# Patient Record
Sex: FEMALE | Race: WHITE
[De-identification: names, ages, dates, MRNs, and addresses within clinical notes are randomized per-mention and may not be internally consistent; named-entity substitution may affect disease eponyms.]

---

## 2017-04-13 ENCOUNTER — HOSPITAL ENCOUNTER (EMERGENCY)
Dept: HOSPITAL 41 - JD.ED | Age: 82
Discharge: HOME | End: 2017-04-13
Payer: MEDICARE

## 2017-04-13 VITALS — SYSTOLIC BLOOD PRESSURE: 173 MMHG | DIASTOLIC BLOOD PRESSURE: 83 MMHG

## 2017-04-13 DIAGNOSIS — I48.91: ICD-10-CM

## 2017-04-13 DIAGNOSIS — Z79.899: ICD-10-CM

## 2017-04-13 DIAGNOSIS — E03.9: ICD-10-CM

## 2017-04-13 DIAGNOSIS — I10: ICD-10-CM

## 2017-04-13 DIAGNOSIS — Z79.01: ICD-10-CM

## 2017-04-13 DIAGNOSIS — R55: Primary | ICD-10-CM

## 2017-04-13 NOTE — EDM.PDOC
ED HPI NEURO





- General


Chief Complaint: Neurological Problem


Stated Complaint: [facility]


Time Seen by Provider: 04/13/17 09:49


Source of Information: Reports: Patient, EMS, RN notes reviewed





- History of Present Illness


INITIAL COMMENTS - FREE TEXT/NARRATIVE: 





86-year-old female who suffered a near syncopal spell a very short time ago, 

she has had multiple similar episodes over this past 2 or 3 weeks. SHe has had 

a recent MRI of her head, CT of chest abdomen pelvis. She states she is 

scheduled for an ultrasound study early next week. This morning she was sitting 

reading a newspaper when she began to feel very weak, lightheaded and dizzy. 

She states she felt warm from the chest up. She felt like she was about to pass 

out. She had no chest discomfort, no abdominal pain nausea or vomiting. She has 

not been ill with recent vomiting diarrhea or other unusual symptoms. She does 

have history of chronic atrial fib  on Coumadin.





- Related Data


Allergies/ADRs: 


 Allergies











Allergy/AdvReac Type Severity Reaction Status Date / Time


 


No Known Allergies Allergy   Verified 04/13/17 09:52











Home Meds: 


 Home Meds





Calcipotriene 1 ampule TOP ONCALL PRN 04/23/15 [History]


Diltiazem HCl [Diltiazem 24Hr Cd] 180 mg PO DAILY 04/23/15 [History]


Levothyroxine 112 mcg PO DAILY 04/23/15 [History]


Metoprolol Tartrate [Lopressor] 50 mg PO Q8H 04/23/15 [History]


Warfarin Sodium 5 mg PO ASDIRECTED 04/23/15 [History]











Past Medical History


Cardiovascular History: Reports: Afib, Hypertension


Endocrine/Metabolic History: Reports: Hypothyroidism


Oncologic (Cancer) History: Reports: Breast





- Past Surgical History


Oncologic Surgical History: Reports: Mastectomy





Social & Family History





- Tobacco Use


Smoking Status *Q: Never Smoker


Years of Tobacco use: 30


Used Tobacco, but Quit: Yes


Month Tobacco Last Used: 1980


Second Hand Smoke Exposure: No





- Caffeine Use


Caffeine Use: Reports: None





- Alcohol Use


Days Per Week of Alcohol Use: 0





- Recreational Drug Use


Recreational Drug Use: No





ED ROS GENERAL





- Review of Systems


Review Of Systems: See Below


Constitutional: Reports: diaphoresis (Gone).  Denies: fever, chills


HEENT: Denies: Sinus problem, Throat pain, Vertigo


Respiratory: Denies: Shortness of Breath, Wheezing, Pleuritic Chest Pain


Cardiovascular: Denies: Chest pain


GI/Abdominal: Denies: Abdominal pain, Nausea, Vomiting


Musculoskeletal: Denies: neck pain, shoulder pain


Skin: Denies: rash


Neurological: Reports: Dizziness (Gone), Weakness (Generalized, gone).  Denies: 

Headache, Numbness, Tingling





ED EXAM, NEURO





- Physical Exam


Exam: See Below


General Appearance: alert, no apparent distress


Eye Exam: bilateral eye: PERRL


Throat/Mouth: Normal inspection, Normal oropharynx


Head Exam: atraumatic.  No: facial swelling


Neck: supple, full range of motion


Respiratory/Chest: lungs clear, normal breath sounds


Cardiovascular: irregularly irregular


GI/Abdominal: soft, non tender.  No: guarding


Neurological: alert, no motor/sensory deficits, oriented x 3


Extremities: normal inspection, normal range of motion.  No: pedal edema, leg 

pain


Skin Exam: Warm, Dry, Normal color





EKG INTERPRETATION


EKG Date: 04/13/17


Rhythm: a-fib


Rate (beats/min): 64


QRS: normal


ST-T: normal





Course





- Vital Signs


Last Recorded V/S: 


 Last Vital Signs











Temp  97.3 F   04/13/17 09:48


 


Pulse  65   04/13/17 09:48


 


Resp  13   04/13/17 09:48


 


BP  173/83 H  04/13/17 09:48


 


Pulse Ox  95   04/13/17 09:48














- Orders/Labs/Meds


Orders: 


 Active Orders 24 hr











 Category Date Time Status


 


 EKG 12 Lead [EKG Documentation Completion] [RC] STAT Care  04/13/17 09:56 

Active


 


 Holter Monitor 48 Hours [RC] .PRN Care  04/13/17 11:34 Active











Labs: 


 Laboratory Tests











  04/13/17 04/13/17 04/13/17 Range/Units





  10:21 10:21 10:21 


 


WBC  6.83    (3.98-10.04)  K/mm3


 


RBC  4.26    (3.98-5.22)  M/mm3


 


Hgb  12.9    (11.2-15.7)  gm/L


 


Hct  38.5    (34.1-44.9)  %


 


MCV  90.4    (79.4-94.8)  fl


 


MCH  30.3    (25.6-32.2)  pg


 


MCHC  33.5    (32.2-35.5)  g/dl


 


RDW Std Deviation  47.4 H    (36.4-46.3)  fL


 


Plt Count  212    (182-369)  K/mm3


 


MPV  9.7    (9.4-12.3)  fl


 


Neut % (Auto)  70.8    (34.0-71.1)  %


 


Lymph % (Auto)  14.8 L    (19.3-51.7)  %


 


Mono % (Auto)  10.7    (4.7-12.5)  %


 


Eos % (Auto)  2.8    (0.7-5.8)  


 


Baso % (Auto)  0.9    (0.1-1.2)  %


 


Neut # (Auto)  4.84    (1.56-6.13)  K/mm3


 


Lymph # (Auto)  1.01 L    (1.18-3.74)  K/mm3


 


Mono # (Auto)  0.73 H    (0.24-0.36)  K/mm3


 


Eos # (Auto)  0.19    (0.04-0.36)  K/mm3


 


Baso # (Auto)  0.06    (0.01-0.08)  K/mm3


 


PT    24.5 H  (8.0-13.0)  SECONDS


 


INR    2.14  


 


Sodium   133 L   (136-145)  mEq/L


 


Potassium   4.5   (3.5-5.1)  mEq/L


 


Chloride   98   ()  mEq/L


 


Carbon Dioxide   26   (21-32)  mEq/L


 


Anion Gap   13.5   (5-15)  


 


BUN   14   (7-18)  mg/dL


 


Creatinine   0.9   (0.55-1.02)  mg/dL


 


Est Cr Clr Drug Dosing   40.38   mL/min


 


Estimated GFR (MDRD)   59   (>60)  mL/min


 


BUN/Creatinine Ratio   15.6   (14-18)  


 


Glucose   95   ()  mg/dL


 


Calcium   8.0 L   (8.5-10.1)  mg/dL


 


Total Bilirubin   0.6   (0.2-1.0)  mg/dL


 


AST   28   (15-37)  U/L


 


ALT   31   (14-59)  U/L


 


Alkaline Phosphatase   97   ()  U/L


 


Total Protein   7.6   (6.4-8.2)  g/dl


 


Albumin   3.9   (3.4-5.0)  g/dl


 


Globulin   3.7   gm/dL


 


Albumin/Globulin Ratio   1.1   (1-2)  














Departure





- Departure


Time of Disposition: 11:59


Disposition: Home, Self-Care 01


Condition: fair


Clinical Impression: 


 Near syncope





Instructions:  Near-Syncope, Easy-to-Read


Referrals: 


Bernice Frye, NP [Primary Care Provider] - 


Forms:  ED Department Discharge


Additional Instructions: 


Rest, drink plenty of water to maintain hydration, as discussed if you do get 

dizzy again try lying flat as best you can until that resolves, continue 

current medications, 48 hour Holter monitor, see Dr. Lopez early next week as 

planned, return to ED as needed, especially if symptoms worsening in any way





- My Orders


Last 24 Hours: 


My Active Orders





04/13/17 09:56


EKG 12 Lead [EKG Documentation Completion] [RC] STAT 





04/13/17 11:34


Holter Monitor 48 Hours [RC] .PRN 














- Assessment/Plan


Last 24 Hours: 


My Active Orders





04/13/17 09:56


EKG 12 Lead [EKG Documentation Completion] [RC] STAT 





04/13/17 11:34


Holter Monitor 48 Hours [RC] .PRN

## 2018-03-31 ENCOUNTER — HOSPITAL ENCOUNTER (INPATIENT)
Dept: HOSPITAL 41 - JD.ED | Age: 83
LOS: 4 days | Discharge: HOME | DRG: 439 | End: 2018-04-04
Attending: INTERNAL MEDICINE | Admitting: INTERNAL MEDICINE
Payer: MEDICARE

## 2018-03-31 DIAGNOSIS — R06.02: ICD-10-CM

## 2018-03-31 DIAGNOSIS — R09.02: ICD-10-CM

## 2018-03-31 DIAGNOSIS — E78.5: ICD-10-CM

## 2018-03-31 DIAGNOSIS — N18.3: ICD-10-CM

## 2018-03-31 DIAGNOSIS — Z79.899: ICD-10-CM

## 2018-03-31 DIAGNOSIS — E83.51: ICD-10-CM

## 2018-03-31 DIAGNOSIS — Z90.11: ICD-10-CM

## 2018-03-31 DIAGNOSIS — K85.10: ICD-10-CM

## 2018-03-31 DIAGNOSIS — Z90.10: ICD-10-CM

## 2018-03-31 DIAGNOSIS — Z85.3: ICD-10-CM

## 2018-03-31 DIAGNOSIS — K86.2: ICD-10-CM

## 2018-03-31 DIAGNOSIS — E87.6: ICD-10-CM

## 2018-03-31 DIAGNOSIS — Z87.891: ICD-10-CM

## 2018-03-31 DIAGNOSIS — K85.90: Primary | ICD-10-CM

## 2018-03-31 DIAGNOSIS — E86.0: ICD-10-CM

## 2018-03-31 DIAGNOSIS — I12.9: ICD-10-CM

## 2018-03-31 DIAGNOSIS — I48.91: ICD-10-CM

## 2018-03-31 DIAGNOSIS — R19.7: ICD-10-CM

## 2018-03-31 DIAGNOSIS — I10: ICD-10-CM

## 2018-03-31 DIAGNOSIS — R53.1: ICD-10-CM

## 2018-03-31 DIAGNOSIS — Z79.01: ICD-10-CM

## 2018-03-31 DIAGNOSIS — Z91.14: ICD-10-CM

## 2018-03-31 DIAGNOSIS — E03.9: ICD-10-CM

## 2018-03-31 DIAGNOSIS — R10.9: ICD-10-CM

## 2018-03-31 PROCEDURE — 81001 URINALYSIS AUTO W/SCOPE: CPT

## 2018-03-31 PROCEDURE — 83690 ASSAY OF LIPASE: CPT

## 2018-03-31 PROCEDURE — 84484 ASSAY OF TROPONIN QUANT: CPT

## 2018-03-31 PROCEDURE — 96361 HYDRATE IV INFUSION ADD-ON: CPT

## 2018-03-31 PROCEDURE — 86738 MYCOPLASMA ANTIBODY: CPT

## 2018-03-31 PROCEDURE — 80053 COMPREHEN METABOLIC PANEL: CPT

## 2018-03-31 PROCEDURE — 96375 TX/PRO/DX INJ NEW DRUG ADDON: CPT

## 2018-03-31 PROCEDURE — 85025 COMPLETE CBC W/AUTO DIFF WBC: CPT

## 2018-03-31 PROCEDURE — 96376 TX/PRO/DX INJ SAME DRUG ADON: CPT

## 2018-03-31 PROCEDURE — 36415 COLL VENOUS BLD VENIPUNCTURE: CPT

## 2018-03-31 PROCEDURE — 93005 ELECTROCARDIOGRAM TRACING: CPT

## 2018-03-31 PROCEDURE — 99285 EMERGENCY DEPT VISIT HI MDM: CPT

## 2018-03-31 PROCEDURE — 74177 CT ABD & PELVIS W/CONTRAST: CPT

## 2018-03-31 PROCEDURE — 96374 THER/PROPH/DIAG INJ IV PUSH: CPT

## 2018-03-31 RX ADMIN — DILTIAZEM HYDROCHLORIDE SCH MG: 60 TABLET, FILM COATED ORAL at 13:06

## 2018-03-31 RX ADMIN — DILTIAZEM HYDROCHLORIDE SCH: 60 TABLET, FILM COATED ORAL at 14:08

## 2018-03-31 NOTE — PCM.HP
H&P History of Present Illness





- General


Date of Service: 03/31/18


Admit Problem/Dx: 


 Admission Diagnosis/Problem





Admission Diagnosis/Problem      Pancreatitis








Source of Information: Provider


History Limitations: Reports: No Limitations





- History of Present Illness


Initial Comments - Free Text/Narative: 














87 year old female who felt well until 2100 hour, and became nauseated.  

Associated with the nausea was abdominal pain and vomiting.  She denies chest 

pain or SOB.  However admits to palpitations and was found to be in A Fib with 

RVR in the ED.  The patient had been seen in [place] where she lives.  She does 

not have a consistent provider.  The patient did not participate freely with 

the history portion of the exam.  When asked about what she eats, she replied, 

"This and that." Dietary restriction to avoid gall bladder attacks were poorly 

received without her active participation.  She denied fever or chills as well 

as no recent change in appetite.  She will be admitted to the ICU, and has been 

started on a Cardizem drip. 


Onset of Symptoms: Reports: Sudden


Symptom Onset Date: 03/30/18


Duration of Symptoms: Reports: Hour(s):, Getting Worse


Location: Reports: Abdomen, Generalized


Severity: Moderate


Improves with: Reports: Medication


Worsens with: Reports: None


Associated Symptoms: Reports: Nausea/Vomiting, Weakness





- Related Data


Allergies/Adverse Reactions: 


 Allergies











Allergy/AdvReac Type Severity Reaction Status Date / Time


 


No Known Allergies Allergy   Verified 03/31/18 06:58











Home Medications: 


 Home Meds





Diltiazem HCl [Diltiazem 24Hr Cd] 120 mg PO DAILY 04/23/15 [History]


Levothyroxine 112 mcg PO SUFRSA 04/23/15 [History]


Metoprolol Tartrate [Lopressor] 50 mg PO BID 04/23/15 [History]


Warfarin Sodium 5 mg PO ASDIRECTED 04/23/15 [History]


Levothyroxine [Synthroid] 100 mcg PO MOTUWETH 03/31/18 [History]











Past Medical History


Cardiovascular History: Reports: Afib, Hypertension


Endocrine/Metabolic History: Reports: Hypothyroidism


Oncologic (Cancer) History: Reports: Breast





- Past Surgical History


Cardiovascular Surgical History: Reports: None


Oncologic Surgical History: Reports: Mastectomy


Other Oncologic Surgeries/Procedures: right breast





Social & Family History





- Family History


Family Medical History: Noncontributory





- Tobacco Use


Smoking Status *Q: Former Smoker


Years of Tobacco use: 20


Used Tobacco, but Quit: Yes


Month/Year Tobacco Last Used: 1980


Second Hand Smoke Exposure: No





- Caffeine Use


Caffeine Use: Reports: Coffee





- Alcohol Use


Days Per Week of Alcohol Use: 0





- Recreational Drug Use


Recreational Drug Use: No





H&P Review of Systems





- Review of Systems:


Review Of Systems: See Below


General: Reports: Weakness, Decreased Appetite


HEENT: Reports: No Symptoms


Pulmonary: Reports: No Symptoms


Cardiovascular: Reports: Lightheadedness


Gastrointestinal: Reports: Abdominal Pain, Decreased Appetite


Genitourinary: Reports: No Symptoms


Musculoskeletal: Reports: No Symptoms


Skin: Reports: No Symptoms


Psychiatric: Reports: No Symptoms


Neurological: Reports: No Symptoms


Hematologic/Lymphatic: Reports: No Symptoms


Immunologic: Reports: No Symptoms





Exam





- Exam


Exam: See Below





- Vital Signs


Vital Signs: 


 Last Vital Signs











Temp  37.6 C   03/31/18 08:00


 


Pulse  95   03/31/18 10:31


 


Resp  15   03/31/18 08:00


 


BP  127/65   03/31/18 10:31


 


Pulse Ox  98   03/31/18 08:00











Weight: 71.758 kg





- Exam


Quality Assessment: DVT Prophylaxis


General: Alert, Oriented, Cooperative


HEENT: Conjunctiva Clear, Pupils Equal, Pupils Reactive


Neck: Trachea Midline


Lungs: Clear to Auscultation, Normal Respiratory Effort


Cardiovascular: Regular Rate, Irregular Rhythm, Tachycardia


GI/Abdominal Exam: Normal Bowel Sounds, Soft, Non-Tender, No Organomegaly, No 

Distention


 (Female) Exam: Deferred


Rectal (Female) Exam: Deferred


Back Exam: Normal Inspection


Extremities: Normal Inspection, Normal Range of Motion, Non-Tender


Skin: Warm


Neurological: Cranial Nerves Intact


Neuro Extensive - Mental Status: Alert, Oriented x3


Neuro Extensive - Motor, Sensory, Reflexes: CN II-XII Intact


Psychiatric: Alert, Normal Affect, Normal Mood





- Patient Data


Lab Results Last 24 hrs: 


 Laboratory Results - last 24 hr











  03/31/18 03/31/18 03/31/18 Range/Units





  01:50 01:50 01:50 


 


WBC  13.59 H    (3.98-10.04)  K/mm3


 


RBC  4.49    (3.98-5.22)  M/mm3


 


Hgb  13.6    (11.2-15.7)  gm/L


 


Hct  41.2    (34.1-44.9)  %


 


MCV  91.8    (79.4-94.8)  fl


 


MCH  30.3    (25.6-32.2)  pg


 


MCHC  33.0    (32.2-35.5)  g/dl


 


RDW Std Deviation  47.6 H    (36.4-46.3)  fL


 


Plt Count  245    (182-369)  K/mm3


 


MPV  9.4    (9.4-12.3)  fl


 


Neut % (Auto)  92.1 H    (34.0-71.1)  %


 


Lymph % (Auto)  3.2 L    (19.3-51.7)  %


 


Mono % (Auto)  4.1 L    (4.7-12.5)  %


 


Eos % (Auto)  0.4 L    (0.7-5.8)  


 


Baso % (Auto)  0.1    (0.1-1.2)  %


 


Neut # (Auto)  12.49 H    (1.56-6.13)  K/mm3


 


Lymph # (Auto)  0.44 L    (1.18-3.74)  K/mm3


 


Mono # (Auto)  0.56 H    (0.24-0.36)  K/mm3


 


Eos # (Auto)  0.06    (0.04-0.36)  K/mm3


 


Baso # (Auto)  0.02    (0.01-0.08)  K/mm3


 


Manual Slide Review  Normal smear    


 


Sodium   143   (136-145)  mEq/L


 


Potassium   3.8   (3.5-5.1)  mEq/L


 


Chloride   103   ()  mEq/L


 


Carbon Dioxide   28   (21-32)  mEq/L


 


Anion Gap   15.8 H   (5-15)  


 


BUN   18   (7-18)  mg/dL


 


Creatinine   1.2 H   (0.55-1.02)  mg/dL


 


Est Cr Clr Drug Dosing   28.38   mL/min


 


Estimated GFR (MDRD)   42   (>60)  mL/min


 


BUN/Creatinine Ratio   15.0   (14-18)  


 


Glucose   144 H   ()  mg/dL


 


Calcium   8.1 L   (8.5-10.1)  mg/dL


 


Total Bilirubin   0.5   (0.2-1.0)  mg/dL


 


AST   28   (15-37)  U/L


 


ALT   23   (14-59)  U/L


 


Alkaline Phosphatase   102   ()  U/L


 


Troponin I   < 0.017   (0.00-0.056)  ng/mL


 


Total Protein   8.2   (6.4-8.2)  g/dl


 


Albumin   4.2   (3.4-5.0)  g/dl


 


Globulin   4.0   gm/dL


 


Albumin/Globulin Ratio   1.1   (1-2)  


 


Lipase   426 H   ()  U/L


 


Urine Color     (Yellow)  


 


Urine Appearance     (Clear)  


 


Urine pH     (5.0-8.0)  


 


Ur Specific Gravity     (1.005-1.030)  


 


Urine Protein     (Negative)  


 


Urine Glucose (UA)     (Negative)  


 


Urine Ketones     (Negative)  


 


Urine Occult Blood     (Negative)  


 


Urine Nitrite     (Negative)  


 


Urine Bilirubin     (Negative)  


 


Urine Urobilinogen     (0.2-1.0)  


 


Ur Leukocyte Esterase     (Negative)  


 


Urine RBC     (0-5)  /hpf


 


Urine WBC     (0-5)  /hpf


 


Ur Epithelial Cells     (0-5)  /hpf


 


Ur Squamous Epith Cells     (0-5)  /hpf


 


Urine Bacteria     (FEW)  /hpf


 


Urine Mucus     (FEW)  /hpf


 


Mycoplasma pneumon IgM    Negative  (NEGATIVE)  














  03/31/18 Range/Units





  03:30 


 


WBC   (3.98-10.04)  K/mm3


 


RBC   (3.98-5.22)  M/mm3


 


Hgb   (11.2-15.7)  gm/L


 


Hct   (34.1-44.9)  %


 


MCV   (79.4-94.8)  fl


 


MCH   (25.6-32.2)  pg


 


MCHC   (32.2-35.5)  g/dl


 


RDW Std Deviation   (36.4-46.3)  fL


 


Plt Count   (182-369)  K/mm3


 


MPV   (9.4-12.3)  fl


 


Neut % (Auto)   (34.0-71.1)  %


 


Lymph % (Auto)   (19.3-51.7)  %


 


Mono % (Auto)   (4.7-12.5)  %


 


Eos % (Auto)   (0.7-5.8)  


 


Baso % (Auto)   (0.1-1.2)  %


 


Neut # (Auto)   (1.56-6.13)  K/mm3


 


Lymph # (Auto)   (1.18-3.74)  K/mm3


 


Mono # (Auto)   (0.24-0.36)  K/mm3


 


Eos # (Auto)   (0.04-0.36)  K/mm3


 


Baso # (Auto)   (0.01-0.08)  K/mm3


 


Manual Slide Review   


 


Sodium   (136-145)  mEq/L


 


Potassium   (3.5-5.1)  mEq/L


 


Chloride   ()  mEq/L


 


Carbon Dioxide   (21-32)  mEq/L


 


Anion Gap   (5-15)  


 


BUN   (7-18)  mg/dL


 


Creatinine   (0.55-1.02)  mg/dL


 


Est Cr Clr Drug Dosing   mL/min


 


Estimated GFR (MDRD)   (>60)  mL/min


 


BUN/Creatinine Ratio   (14-18)  


 


Glucose   ()  mg/dL


 


Calcium   (8.5-10.1)  mg/dL


 


Total Bilirubin   (0.2-1.0)  mg/dL


 


AST   (15-37)  U/L


 


ALT   (14-59)  U/L


 


Alkaline Phosphatase   ()  U/L


 


Troponin I   (0.00-0.056)  ng/mL


 


Total Protein   (6.4-8.2)  g/dl


 


Albumin   (3.4-5.0)  g/dl


 


Globulin   gm/dL


 


Albumin/Globulin Ratio   (1-2)  


 


Lipase   ()  U/L


 


Urine Color  Yellow  (Yellow)  


 


Urine Appearance  Clear  (Clear)  


 


Urine pH  5.5  (5.0-8.0)  


 


Ur Specific Gravity  1.025  (1.005-1.030)  


 


Urine Protein  Negative  (Negative)  


 


Urine Glucose (UA)  Negative  (Negative)  


 


Urine Ketones  Negative  (Negative)  


 


Urine Occult Blood  2+ H  (Negative)  


 


Urine Nitrite  Negative  (Negative)  


 


Urine Bilirubin  Negative  (Negative)  


 


Urine Urobilinogen  0.2  (0.2-1.0)  


 


Ur Leukocyte Esterase  Negative  (Negative)  


 


Urine RBC  0-5  (0-5)  /hpf


 


Urine WBC  0-5  (0-5)  /hpf


 


Ur Epithelial Cells  0-5  (0-5)  /hpf


 


Ur Squamous Epith Cells  0-5  (0-5)  /hpf


 


Urine Bacteria  Few  (FEW)  /hpf


 


Urine Mucus  Not seen  (FEW)  /hpf


 


Mycoplasma pneumon IgM   (NEGATIVE)  











Result Diagrams: 


 03/31/18 01:50





 03/31/18 01:50





- Problem List


(1) Diarrhea


SNOMED Code(s): 49824727


   ICD Code: R19.7 - DIARRHEA, UNSPECIFIED   Status: Acute   Current Visit: Yes

   





(2) Pancreatitis


SNOMED Code(s): 35227726


   ICD Code: K85.90 - ACUTE PANCREATITIS WITHOUT NECROSIS OR INFECTION, UNSP   

Status: Acute   Current Visit: Yes   


Qualifiers: 


   Chronicity: acute   Pancreatitis type: other   Acute pancreatitis 

complication: no infection or necrosis   Qualified Code(s): K85.80 - Other 

acute pancreatitis without necrosis or infection   





(3) Vomiting


SNOMED Code(s): 298923227


   ICD Code: R11.10 - VOMITING, UNSPECIFIED   Status: Acute   Current Visit: 

Yes   


Problem List Initiated/Reviewed/Updated: Yes


Orders Last 24hrs: 


 Active Orders 24 hr











 Category Date Time Status


 


 Patient Status [ADT] Routine ADT  03/31/18 05:49 Active


 


 NPO [Nothing Per Oral Diet] [DIET] Diet  03/31/18 Lunch Active


 


 Abdomen Pelvis w Cont [CT] Stat Exams  03/31/18 03:22 Taken


 


 INFLUENZA A+B AG SCREEN [RM] Routine Lab  03/31/18 09:55 Ordered


 


 STREP PNEUMONIAE ANTIGEN [MREF] Routine Lab  03/31/18 09:54 Ordered


 


 UA W/MICROSCOPIC [URIN] Stat Lab  03/31/18 03:30 Ordered


 


 Acetaminophen [Tylenol] Med  03/31/18 07:10 Active





 650 mg PO Q4H PRN   


 


 Diltiazem 125 mg Med  03/31/18 05:30 Active





 Sodium Chloride 0.9% [Normal Saline] 100 ml   





 IV TITRATE   


 


 Sodium Chloride 0.9% [Normal Saline] 1,000 ml Med  03/31/18 03:45 Active





 IV ASDIRECTED   


 


 Sodium Chloride 0.9% [Saline Flush] Med  03/31/18 01:50 Active





 10 ml FLUSH ASDIRECTED PRN   


 


 Sodium Chloride 0.9% [Saline Flush] Med  03/31/18 04:10 Active





 10 ml FLUSH ONETIME PRN   


 


 ED Antiemetic Medication Reflex [OM.PC] Stat Oth  03/31/18 01:50 Ordered


 


 Peripheral IV Insertion Adult [OM.PC] Stat Oth  03/31/18 01:50 Ordered


 


 Code Status [Resuscitation Status] Routine Resus Stat  03/31/18 10:46 Ordered


 


 EKG 12 Lead [EK] Stat Ther  03/31/18 01:52 Ordered








 Medication Orders





Acetaminophen (Tylenol)  650 mg PO Q4H PRN


   PRN Reason: Fever


Sodium Chloride (Normal Saline)  1,000 mls @ 125 mls/hr IV ASDIRECTED EZEKIEL


   Last Admin: 03/31/18 03:41  Dose: 125 mls/hr


Diltiazem HCl 125 mg/ Sodium (Chloride)  125 mls @ 5 mls/hr IV TITRATE EZEKIEL; 

Protocol


   Last Titration: 03/31/18 08:08  Dose: 3 mg/hr, 3 mls/hr


   Admin: 03/31/18 05:47  Dose: 5 mg/hr, 5 mls/hr


Sodium Chloride (Saline Flush)  10 ml FLUSH ASDIRECTED PRN


   PRN Reason: Keep Vein Open


   Last Admin: 03/31/18 01:58  Dose: 10 ml


Sodium Chloride (Saline Flush)  10 ml FLUSH ONETIME PRN


   PRN Reason: IV FLUSH


   Last Admin: 03/31/18 04:30  Dose: 10 ml








Assessment/Plan Comment:: 











Impression:





A Fib with RVR





Gallstone pancreatitis





Dehydration





CKD, stage 3











Chronic


Hx of Breast CA


HTN


HLD











Plan:





ICU


NPO except ice chips and meds, 


advance to clear liquids


Supportive care


Daily Labs


Home meds


Cardizem gtt


Abdominal US re: gallstones/pancreas


DVT/GI prophylaxis


Consult PT/OT/CM

## 2018-03-31 NOTE — CT
CT abdomen and pelvis

 

Technique: Multiple axial sections were obtained from above the dome 

of the diaphragm inferiorly through the pubic symphysis.  Intravenous 

contrast was utilized.  No oral contrast has been given.

 

Comparison: Previous CT abdomen and pelvis exam of 12/07/09 and prior 

CT abdomen and pelvis study of 04/03/17.

 

Findings: Visualized portions of both lung bases show nothing acute.  

Heart is slightly enlarged.  Enhancing lesions are seen within the 

liver which remain stable and are felt to be benign.  Adrenal glands 

show no nodule.  Spleen appears within normal limits.  Main pancreatic

 duct is slightly prominent.  Cystic area is noted within the 

pancreatic head inferiorly measuring about 2.3 cm.  This is similar to

 most recent CT exam most likely representing a benign pancreatic 

tumor.  Increased density is seen within the gallbladder most likely 

due to gallstones.  Kidneys show symmetric contrast enhancement 

without hydronephrosis or mass.

 

Aorta shows atherosclerotic change which continues into the iliac 

vessels.  No retroperitoneal adenopathy or mesenteric abnormalities 

are seen.  No pelvic mass or adenopathy is seen.  Diverticuli are seen

 within the descending and sigmoid colon without evidence of 

diverticulitis.  No free fluid or inflammatory change is seen within 

the abdomen or pelvis.  Appendix is not visualized with certainty.

 

Delayed images show contrast within the bladder.

 

Bone window settings were reviewed which show diffuse degenerative 

change throughout the spine.

 

Impression:

1.  Cystic lesion within the head of the pancreas most likely due to 

benign tumor.  This is similar to prior CT study from 2017.

2.  Several hyperenhancing lesions within the liver which also remain 

stable and felt to be benign.

3.  Increased density within the gallbladder most likely due to 

gallstones.

4.  Other incidental findings.

 

Insignificant disagree with preliminary report issued by [employer] (preliminary report signed at 03/31/18, 5:51 AM [facility] 

Time)

## 2018-03-31 NOTE — EDM.PDOC
ED HPI GENERAL MEDICAL PROBLEM





- General


Chief Complaint: Gastrointestinal Problem


Stated Complaint: [facility]


Time Seen by Provider: 03/31/18 01:44


Source of Information: Reports: Patient, EMS


History Limitations: Reports: No Limitations





- History of Present Illness


INITIAL COMMENTS - FREE TEXT/NARRATIVE: 





The patient presents with nausea, vomiting and diarrhea since 2130 last night.  

She also has some abdominal pain but that is not as bad as the vomiting.  She 

did not eat any bad food.  She was at a grade school in [facility] a couple days 

ago.  She has no fever, chills, cough, congestion, runny nose, chest pain, or 

dysuria.  She does have a history of A-fib.  She has a rapid heart rate 

tonight.  She does have some shortness of breath but that is normal for her.


Onset: Gradual


Duration: Hour(s): (Last night at 2130.)


Location: Reports: Abdomen


Quality: Reports: Ache


Severity: Mild


Improves with: Reports: None


Worsens with: Reports: None


Associated Symptoms: Reports: Nausea/Vomiting.  Denies: Chest Pain, Cough, Fever

/Chills, Headaches, Shortness of Breath





- Related Data


 Allergies











Allergy/AdvReac Type Severity Reaction Status Date / Time


 


No Known Allergies Allergy   Verified 03/31/18 01:33











Home Meds: 


 Home Meds





Diltiazem HCl [Diltiazem 24Hr Cd] 120 mg PO DAILY 04/23/15 [History]


Levothyroxine 112 mcg PO DAILY 04/23/15 [History]


Metoprolol Tartrate [Lopressor] 50 mg PO BID 04/23/15 [History]


Warfarin Sodium 5 mg PO ASDIRECTED 04/23/15 [History]











Past Medical History


Cardiovascular History: Reports: Afib, Hypertension


Endocrine/Metabolic History: Reports: Hypothyroidism


Oncologic (Cancer) History: Reports: Breast





- Past Surgical History


Oncologic Surgical History: Reports: Mastectomy





Social & Family History





- Tobacco Use


Smoking Status *Q: Never Smoker


Years of Tobacco use: 30


Used Tobacco, but Quit: Yes


Month/Year Tobacco Last Used: 1980


Second Hand Smoke Exposure: No





- Caffeine Use


Caffeine Use: Reports: None





- Alcohol Use


Days Per Week of Alcohol Use: 0





- Recreational Drug Use


Recreational Drug Use: No





ED ROS GENERAL





- Review of Systems


Review Of Systems: See Below


Constitutional: Reports: No Symptoms


HEENT: Reports: No Symptoms


Respiratory: Reports: Shortness of Breath


Cardiovascular: Reports: No Symptoms


Endocrine: Reports: No Symptoms


GI/Abdominal: Reports: Abdominal Pain, Diarrhea, Nausea, Vomiting


: Reports: No Symptoms


Musculoskeletal: Reports: No Symptoms





ED EXAM, GI/ABD





- Physical Exam


Exam: See Below


Exam Limited By: No Limitations


General Appearance: Alert, No Apparent Distress


Ears: Normal External Exam


Nose: Normal Inspection


Head: Atraumatic, Normocephalic


Neck: Normal Inspection


Respiratory/Chest: No Respiratory Distress, Lungs Clear, Normal Breath Sounds


Cardiovascular: No Edema, No Murmur, Irregularly Irregular


GI/Abdominal Exam: Soft, No Organomegaly, No Mass, Tender (Mild upper abdominal 

tenderness)


Back Exam: Normal Inspection


Extremities: Normal Inspection


Neurological: Alert, Oriented, No Motor/Sensory Deficits





Course





- Vital Signs


Last Recorded V/S: 


 Last Vital Signs











Temp  98.9 F   03/31/18 01:29


 


Pulse  121 H  03/31/18 01:29


 


Resp  18   03/31/18 01:29


 


BP  146/87 H  03/31/18 01:29


 


Pulse Ox  95   03/31/18 01:29














- Orders/Labs/Meds


Orders: 


 Active Orders 24 hr











 Category Date Time Status


 


 EKG Documentation Completion [RC] ASDIRECTED Care  03/31/18 01:52 Active


 


 Peripheral IV Care [RC] .AS DIRECTED Care  03/31/18 01:51 Active


 


 Abdomen Pelvis w Cont [CT] Stat Exams  03/31/18 03:22 Taken


 


 UA W/MICROSCOPIC [URIN] Stat Lab  03/31/18 03:30 Ordered


 


 Sodium Chloride 0.9% [Normal Saline] 1,000 ml Med  03/31/18 03:45 Active





 IV ASDIRECTED   


 


 Sodium Chloride 0.9% [Saline Flush] Med  03/31/18 01:50 Active





 10 ml FLUSH ASDIRECTED PRN   


 


 Sodium Chloride 0.9% [Saline Flush] Med  03/31/18 04:10 Active





 10 ml FLUSH ONETIME PRN   


 


 ED Antiemetic Medication Reflex [OM.PC] Stat Oth  03/31/18 01:50 Ordered


 


 Peripheral IV Insertion Adult [OM.PC] Stat Oth  03/31/18 01:50 Ordered


 


 EKG 12 Lead [EK] Stat Ther  03/31/18 01:52 Ordered








 Medication Orders





Sodium Chloride (Normal Saline)  1,000 mls @ 125 mls/hr IV ASDIRECTED EZEKIEL


   Last Admin: 03/31/18 03:41  Dose: 125 mls/hr


Sodium Chloride (Saline Flush)  10 ml FLUSH ASDIRECTED PRN


   PRN Reason: Keep Vein Open


   Last Admin: 03/31/18 01:58  Dose: 10 ml


Sodium Chloride (Saline Flush)  10 ml FLUSH ONETIME PRN


   PRN Reason: IV FLUSH


   Last Admin: 03/31/18 04:30  Dose: 10 ml








Labs: 


 Laboratory Tests











  03/31/18 03/31/18 03/31/18 Range/Units





  01:50 01:50 03:30 


 


WBC  13.59 H    (3.98-10.04)  K/mm3


 


RBC  4.49    (3.98-5.22)  M/mm3


 


Hgb  13.6    (11.2-15.7)  gm/L


 


Hct  41.2    (34.1-44.9)  %


 


MCV  91.8    (79.4-94.8)  fl


 


MCH  30.3    (25.6-32.2)  pg


 


MCHC  33.0    (32.2-35.5)  g/dl


 


RDW Std Deviation  47.6 H    (36.4-46.3)  fL


 


Plt Count  245    (182-369)  K/mm3


 


MPV  9.4    (9.4-12.3)  fl


 


Neut % (Auto)  92.1 H    (34.0-71.1)  %


 


Lymph % (Auto)  3.2 L    (19.3-51.7)  %


 


Mono % (Auto)  4.1 L    (4.7-12.5)  %


 


Eos % (Auto)  0.4 L    (0.7-5.8)  


 


Baso % (Auto)  0.1    (0.1-1.2)  %


 


Neut # (Auto)  12.49 H    (1.56-6.13)  K/mm3


 


Lymph # (Auto)  0.44 L    (1.18-3.74)  K/mm3


 


Mono # (Auto)  0.56 H    (0.24-0.36)  K/mm3


 


Eos # (Auto)  0.06    (0.04-0.36)  K/mm3


 


Baso # (Auto)  0.02    (0.01-0.08)  K/mm3


 


Manual Slide Review  Normal smear    


 


Sodium   143   (136-145)  mEq/L


 


Potassium   3.8   (3.5-5.1)  mEq/L


 


Chloride   103   ()  mEq/L


 


Carbon Dioxide   28   (21-32)  mEq/L


 


Anion Gap   15.8 H   (5-15)  


 


BUN   18   (7-18)  mg/dL


 


Creatinine   1.2 H   (0.55-1.02)  mg/dL


 


Est Cr Clr Drug Dosing   28.38   mL/min


 


Estimated GFR (MDRD)   42   (>60)  mL/min


 


BUN/Creatinine Ratio   15.0   (14-18)  


 


Glucose   144 H   ()  mg/dL


 


Calcium   8.1 L   (8.5-10.1)  mg/dL


 


Total Bilirubin   0.5   (0.2-1.0)  mg/dL


 


AST   28   (15-37)  U/L


 


ALT   23   (14-59)  U/L


 


Alkaline Phosphatase   102   ()  U/L


 


Troponin I   < 0.017   (0.00-0.056)  ng/mL


 


Total Protein   8.2   (6.4-8.2)  g/dl


 


Albumin   4.2   (3.4-5.0)  g/dl


 


Globulin   4.0   gm/dL


 


Albumin/Globulin Ratio   1.1   (1-2)  


 


Lipase   426 H   ()  U/L


 


Urine Color    Yellow  (Yellow)  


 


Urine Appearance    Clear  (Clear)  


 


Urine pH    5.5  (5.0-8.0)  


 


Ur Specific Gravity    1.025  (1.005-1.030)  


 


Urine Protein    Negative  (Negative)  


 


Urine Glucose (UA)    Negative  (Negative)  


 


Urine Ketones    Negative  (Negative)  


 


Urine Occult Blood    2+ H  (Negative)  


 


Urine Nitrite    Negative  (Negative)  


 


Urine Bilirubin    Negative  (Negative)  


 


Urine Urobilinogen    0.2  (0.2-1.0)  


 


Ur Leukocyte Esterase    Negative  (Negative)  


 


Urine RBC    0-5  (0-5)  /hpf


 


Urine WBC    0-5  (0-5)  /hpf


 


Ur Epithelial Cells    0-5  (0-5)  /hpf


 


Ur Squamous Epith Cells    0-5  (0-5)  /hpf


 


Urine Bacteria    Few  (FEW)  /hpf


 


Urine Mucus    Not seen  (FEW)  /hpf











Meds: 


Medications











Generic Name Dose Route Start Last Admin





  Trade Name Freq  PRN Reason Stop Dose Admin


 


Sodium Chloride  1,000 mls @ 125 mls/hr  03/31/18 03:45  03/31/18 03:41





  Normal Saline  IV   125 mls/hr





  ASDIRECTED EZEKIEL   Administration





     





     





     





     


 


Sodium Chloride  10 ml  03/31/18 01:50  03/31/18 01:58





  Saline Flush  FLUSH   10 ml





  ASDIRECTED PRN   Administration





  Keep Vein Open   





     





     





     


 


Sodium Chloride  10 ml  03/31/18 04:10  03/31/18 04:30





  Saline Flush  FLUSH   10 ml





  ONETIME PRN   Administration





  IV FLUSH   





     





     





     














Discontinued Medications














Generic Name Dose Route Start Last Admin





  Trade Name Freq  PRN Reason Stop Dose Admin


 


Diltiazem HCl  10 mg  03/31/18 03:36  03/31/18 03:40





  Diltiazem  IVPUSH  03/31/18 03:37  10 mg





  ONETIME ONE   Administration





     





     





     





     


 


Diltiazem HCl  10 mg  03/31/18 04:58  03/31/18 05:05





  Diltiazem  IVPUSH  03/31/18 04:59  10 mg





  ONETIME ONE   Administration





     





     





     





     


 


Sodium Chloride  1,000 mls @ 1,000 mls/hr  03/31/18 01:50  03/31/18 01:59





  Normal Saline  IV  03/31/18 02:49  1,000 mls/hr





  .BOLUS STA   Administration





     





     





     





     


 


Iopamidol  100 ml  03/31/18 04:10  03/31/18 04:30





  Isovue-370 (76%)  IVPUSH  03/31/18 04:11  100 ml





  ONETIME ONE   Administration





     





     





     





     


 


Metoclopramide HCl  10 mg  03/31/18 03:21  03/31/18 03:37





  Reglan  IVPUSH  03/31/18 03:22  10 mg





  ONETIME ONE   Administration





     





     





     





     


 


Ondansetron HCl  4 mg  03/31/18 01:50  03/31/18 01:59





  Zofran  IVPUSH  03/31/18 01:51  4 mg





  ONETIME ONE   Administration





     





     





     





     














- Re-Assessments/Exams


Free Text/Narrative Re-Assessment/Exam: 





03/31/18 03:46


I ordered an IV NS 1L bolus, zofran 4mg IV, labs, UA, and an EKG.  Her EKG 

shows A-fib at a rate of 113 without any acute changes.  Her WBC was elevated 

at 13.59.  Her anion gap is elevated at 15.8.  Her creatinine is elevated at 

1.2.  Her glucose is 144.  Her troponin is negative.  Her lipase is elevated 

slightly at 426.  Her UA shows no UTI.  She has more nausea so I ordered reglan 

10mg IV.  Her heart rate will still go up to the 130s and 150s at times.  I 

ordered a bolus of 10mg of cardizem.  I have also ordered a CT of her abdomen 

and pelvis.


03/31/18 05:01


Her CT shows no acute findings, cholelithiasis and colonic diverticulosis.  Her 

oxygen saturations did to down to 88.  My nurse put her on some oxygen.  Her 

heart rate is still above 100.  I ordered 10mg of cardizem.  That did help.  I 

feel she needs to be admitted.  I called Dr Max and she agreed to the 

admission.


03/31/18 05:17


I talked to Dr Max and she agreed to the admission.





Departure





- Departure


Time of Disposition: 05:20


Disposition: Admitted As Inpatient 66


Clinical Impression: 


 Vomiting, Diarrhea, Hypoxia





Pancreatitis


Qualifiers:


 Chronicity: acute Pancreatitis type: other Acute pancreatitis complication: no 

infection or necrosis Qualified Code(s): K85.80 - Other acute pancreatitis 

without necrosis or infection








- Discharge Information


Referrals: 


PCP,None [Primary Care Provider] - 


Forms:  ED Department Discharge





- My Orders


Last 24 Hours: 


My Active Orders





03/31/18 01:50


Sodium Chloride 0.9% [Saline Flush]   10 ml FLUSH ASDIRECTED PRN 


ED Antiemetic Medication Reflex [OM.PC] Stat 


Peripheral IV Insertion Adult [OM.PC] Stat 





03/31/18 01:51


Peripheral IV Care [RC] .AS DIRECTED 





03/31/18 01:52


EKG Documentation Completion [RC] ASDIRECTED 


EKG 12 Lead [EK] Stat 





03/31/18 03:22


Abdomen Pelvis w Cont [CT] Stat 





03/31/18 03:30


UA W/MICROSCOPIC [URIN] Stat 





03/31/18 03:45


Sodium Chloride 0.9% [Normal Saline] 1,000 ml IV ASDIRECTED 





03/31/18 04:10


Sodium Chloride 0.9% [Saline Flush]   10 ml FLUSH ONETIME PRN 














- Assessment/Plan


Last 24 Hours: 


My Active Orders





03/31/18 01:50


Sodium Chloride 0.9% [Saline Flush]   10 ml FLUSH ASDIRECTED PRN 


ED Antiemetic Medication Reflex [OM.PC] Stat 


Peripheral IV Insertion Adult [OM.PC] Stat 





03/31/18 01:51


Peripheral IV Care [RC] .AS DIRECTED 





03/31/18 01:52


EKG Documentation Completion [RC] ASDIRECTED 


EKG 12 Lead [EK] Stat 





03/31/18 03:22


Abdomen Pelvis w Cont [CT] Stat 





03/31/18 03:30


UA W/MICROSCOPIC [URIN] Stat 





03/31/18 03:45


Sodium Chloride 0.9% [Normal Saline] 1,000 ml IV ASDIRECTED 





03/31/18 04:10


Sodium Chloride 0.9% [Saline Flush]   10 ml FLUSH ONETIME PRN

## 2018-04-01 RX ADMIN — DILTIAZEM HYDROCHLORIDE SCH: 60 TABLET, FILM COATED ORAL at 01:38

## 2018-04-01 RX ADMIN — DILTIAZEM HYDROCHLORIDE SCH MG: 60 TABLET, FILM COATED ORAL at 13:47

## 2018-04-01 RX ADMIN — DILTIAZEM HYDROCHLORIDE SCH MG: 60 TABLET, FILM COATED ORAL at 06:05

## 2018-04-01 NOTE — PCM.PN
- General Info


Date of Service: 04/01/18


Admission Dx/Problem (Free Text): 


 Admission Diagnosis/Problem





Admission Diagnosis/Problem      Pancreatitis








Subjective Update: 


Follow Up





Functional Status: Reports: Pain Controlled, Tolerating Diet, Ambulating, 

Urinating.  Denies: New Symptoms





- Review of Systems


General: Denies: Fever, Weakness, Fatigue, Malaise, Chills


HEENT: Reports: No Symptoms


Pulmonary: Denies: Shortness of Breath


Cardiovascular: Denies: Chest Pain, Palpitations, Dyspnea on Exertion, Edema, 

Lightheadedness


Gastrointestinal: Denies: Abdominal Pain, Constipation, Diarrhea, Nausea, 

Vomiting


Genitourinary: Reports: No Symptoms


Musculoskeletal: Reports: No Symptoms


Skin: Denies: Cyanosis, Mottled, Pallor, Diaphoresis, Bruising, Pruritis, Rash


Neurological: Denies: Confusion, Difficulty Walking, Weakness, Gait Disturbance


Psychiatric: Denies: Depression, Anxiety, Agitation, Hallucinations


Systems Review Comment:: 


No overnight or acute issues. She feels pretty good this morning. She is still 

having diarrhea but reports no new complaints. Her HR remains controlled. 








- Patient Data


Vitals - Most Recent: 


 Last Vital Signs











Temp  37.0 C   04/01/18 04:00


 


Pulse  75   04/01/18 04:00


 


Resp  18   04/01/18 04:00


 


BP  131/76   04/01/18 04:00


 


Pulse Ox  94 L  04/01/18 04:00











Weight - Most Recent: 73.164 kg


I&O - Last 24 Hours: 


 Intake & Output











 03/31/18 04/01/18 04/01/18





 22:59 06:59 14:59


 


Intake Total 1795 1947 


 


Output Total 800  


 


Balance 995 1947 











Lab Results Last 24 Hours: 


 Laboratory Results - last 24 hr











  03/31/18 03/31/18 04/01/18 Range/Units





  01:50 14:15 05:55 


 


WBC    5.13  (3.98-10.04)  K/mm3


 


RBC    4.05  (3.98-5.22)  M/mm3


 


Hgb    12.2  (11.2-15.7)  gm/L


 


Hct    37.8  (34.1-44.9)  %


 


MCV    93.3  (79.4-94.8)  fl


 


MCH    30.1  (25.6-32.2)  pg


 


MCHC    32.3  (32.2-35.5)  g/dl


 


RDW Std Deviation    50.9 H  (36.4-46.3)  fL


 


Plt Count    166 L  (182-369)  K/mm3


 


MPV    9.5  (9.4-12.3)  fl


 


Neut % (Auto)    66.7  (34.0-71.1)  %


 


Lymph % (Auto)    17.7 L  (19.3-51.7)  %


 


Mono % (Auto)    12.9 H  (4.7-12.5)  %


 


Eos % (Auto)    2.1  (0.7-5.8)  


 


Baso % (Auto)    0.4  (0.1-1.2)  %


 


Neut # (Auto)    3.42  (1.56-6.13)  K/mm3


 


Lymph # (Auto)    0.91 L  (1.18-3.74)  K/mm3


 


Mono # (Auto)    0.66 H  (0.24-0.36)  K/mm3


 


Eos # (Auto)    0.11  (0.04-0.36)  K/mm3


 


Baso # (Auto)    0.02  (0.01-0.08)  K/mm3


 


PT   21.7 H   (8.0-13.0)  SECONDS


 


INR   2.01   


 


Sodium     (136-145)  mEq/L


 


Potassium     (3.5-5.1)  mEq/L


 


Chloride     ()  mEq/L


 


Carbon Dioxide     (21-32)  mEq/L


 


Anion Gap     (5-15)  


 


BUN     (7-18)  mg/dL


 


Creatinine     (0.55-1.02)  mg/dL


 


Est Cr Clr Drug Dosing     mL/min


 


Estimated GFR (MDRD)     (>60)  mL/min


 


BUN/Creatinine Ratio     (14-18)  


 


Glucose     ()  mg/dL


 


Lactic Acid     (0.4-2.0)  mmol/L


 


Calcium     (8.5-10.1)  mg/dL


 


C-Reactive Protein     (<1.0)  mg/dL


 


Lipase     ()  U/L


 


Mycoplasma pneumon IgM  Negative    (NEGATIVE)  














  04/01/18 04/01/18 Range/Units





  05:55 05:55 


 


WBC    (3.98-10.04)  K/mm3


 


RBC    (3.98-5.22)  M/mm3


 


Hgb    (11.2-15.7)  gm/L


 


Hct    (34.1-44.9)  %


 


MCV    (79.4-94.8)  fl


 


MCH    (25.6-32.2)  pg


 


MCHC    (32.2-35.5)  g/dl


 


RDW Std Deviation    (36.4-46.3)  fL


 


Plt Count    (182-369)  K/mm3


 


MPV    (9.4-12.3)  fl


 


Neut % (Auto)    (34.0-71.1)  %


 


Lymph % (Auto)    (19.3-51.7)  %


 


Mono % (Auto)    (4.7-12.5)  %


 


Eos % (Auto)    (0.7-5.8)  


 


Baso % (Auto)    (0.1-1.2)  %


 


Neut # (Auto)    (1.56-6.13)  K/mm3


 


Lymph # (Auto)    (1.18-3.74)  K/mm3


 


Mono # (Auto)    (0.24-0.36)  K/mm3


 


Eos # (Auto)    (0.04-0.36)  K/mm3


 


Baso # (Auto)    (0.01-0.08)  K/mm3


 


PT    (8.0-13.0)  SECONDS


 


INR    


 


Sodium  138   (136-145)  mEq/L


 


Potassium  3.6   (3.5-5.1)  mEq/L


 


Chloride  103   ()  mEq/L


 


Carbon Dioxide  24   (21-32)  mEq/L


 


Anion Gap  14.6   (5-15)  


 


BUN  13   (7-18)  mg/dL


 


Creatinine  1.1 H   (0.55-1.02)  mg/dL


 


Est Cr Clr Drug Dosing  32.42   mL/min


 


Estimated GFR (MDRD)  47   (>60)  mL/min


 


BUN/Creatinine Ratio  11.8 L   (14-18)  


 


Glucose  79 L   ()  mg/dL


 


Lactic Acid   0.7  (0.4-2.0)  mmol/L


 


Calcium  6.5 L   (8.5-10.1)  mg/dL


 


C-Reactive Protein  3.3 H*   (<1.0)  mg/dL


 


Lipase  261   ()  U/L


 


Mycoplasma pneumon IgM    (NEGATIVE)  











Zechariah Results Last 24 Hours: 


 Microbiology











 03/31/18 12:27 Stool for WBCs - Final





 Stool / Feces    NO WBC SEEN





 Rotavirus Antigen - Final





    NEGATIVE ROTAVIRUS ANTIGEN


 


 03/31/18 12:13 Influenza Type A Antigen Screen - Final





 Nasal, Unspecified    NEGATIVE INFLUENZA A VIRUS AG





 Influenza Type B Antigen Screen - Final





    NEGATIVE INFLUENZA B VIRUS AG











Med Orders - Current: 


 Current Medications





Acetaminophen (Tylenol)  650 mg PO Q4H PRN


   PRN Reason: Fever


Diltiazem HCl (Cardizem)  90 mg PO Q8HR [facility]


   Last Admin: 04/01/18 06:05 Dose:  90 mg


Diltiazem HCl 125 mg/ Sodium (Chloride)  125 mls @ 5 mls/hr IV TITRATE EZEKIEL; 

Protocol


   Last Titration: 03/31/18 11:37 Dose:  0 mg/hr, 0 mls/hr


Sodium Chloride (Normal Saline)  1,000 mls @ 100 mls/hr IV ASDIRECTED [facility]


   Last Admin: 04/01/18 06:32 Dose:  75 mls/hr


Levothyroxine Sodium (Synthroid)  100 mcg PO MoTuWeTh@0600 [facility]


Levothyroxine Sodium (Levothyroxine)  112 mcg PO SuFrSa@0600 [facility]


   Last Admin: 04/01/18 06:05 Dose:  112 mcg


Metoprolol Tartrate (Lopressor)  50 mg PO BID [facility]


   Last Admin: 03/31/18 21:05 Dose:  50 mg


Ondansetron HCl (Zofran)  4 mg IVPUSH Q8H PRN


   PRN Reason: Nausea/Vomiting


Sodium Chloride (Saline Flush)  10 ml FLUSH ASDIRECTED PRN


   PRN Reason: Keep Vein Open


   Last Admin: 03/31/18 01:58 Dose:  10 ml


Sodium Chloride (Saline Flush)  10 ml FLUSH ONETIME PRN


   PRN Reason: IV FLUSH


   Last Admin: 03/31/18 04:30 Dose:  10 ml


Warfarin Sodium (Coumadin)  5 mg PO DAILY@1800 [facility]


   Last Admin: 03/31/18 17:59 Dose:  5 mg





Discontinued Medications





Diltiazem HCl (Diltiazem)  10 mg IVPUSH ONETIME ONE


   Stop: 03/31/18 03:37


   Last Admin: 03/31/18 03:40 Dose:  10 mg


Diltiazem HCl (Diltiazem)  10 mg IVPUSH ONETIME ONE


   Stop: 03/31/18 04:59


   Last Admin: 03/31/18 05:05 Dose:  10 mg


Sodium Chloride (Normal Saline)  1,000 mls @ 1,000 mls/hr IV .BOLUS STA


   Stop: 03/31/18 02:49


   Last Admin: 03/31/18 01:59 Dose:  1,000 mls/hr


Sodium Chloride (Normal Saline)  1,000 mls @ 125 mls/hr IV ASDIRECTED EZEKIEL


   Last Admin: 03/31/18 03:41 Dose:  125 mls/hr


Dextrose/Sodium Chloride (Dextrose 5%-Normal Saline)  1,000 mls @ 125 mls/hr IV 

ASDIRECTED EZEKIEL


Iopamidol (Isovue-370 (76%))  100 ml IVPUSH ONETIME ONE


   Stop: 03/31/18 04:11


   Last Admin: 03/31/18 04:30 Dose:  100 ml


Metoclopramide HCl (Reglan)  10 mg IVPUSH ONETIME ONE


   Stop: 03/31/18 03:22


   Last Admin: 03/31/18 03:37 Dose:  10 mg


Metoprolol Tartrate (Lopressor)  50 mg PO ONETIME ONE


   Stop: 03/31/18 10:13


   Last Admin: 03/31/18 10:31 Dose:  50 mg


Ondansetron HCl (Zofran)  4 mg IVPUSH ONETIME ONE


   Stop: 03/31/18 01:51


   Last Admin: 03/31/18 01:59 Dose:  4 mg











- Exam


Quality Assessment: No: Supplemental Oxygen


General: Alert, Oriented, Cooperative, No Acute Distress


HEENT: Pupils Equal, Pupils Reactive, EOMI, Mucous Membr. Moist/Pink, Other (

facial flushing)


Neck: Supple, Trachea Midline, No Thyromegaly


Lungs: Normal Respiratory Effort, Decreased Breath Sounds


Cardiovascular: Regular Rate, Irregular Rhythm


GI/Abdominal Exam: Normal Bowel Sounds, Soft, Non-Tender, No Organomegaly, No 

Distention, No Abnormal Bruit


 (Female) Exam: Deferred


Back Exam: Normal Inspection, Decreased Range of Motion


Extremities: Normal Inspection, Normal Range of Motion, Non-Tender, No Pedal 

Edema, Normal Capillary Refill


Peripheral Pulses: 2+: Dorsalis Pedis (L), Dorsalis Pedis (R)


Skin: Warm, Dry, Intact


Neurological: No New Focal Deficit


Psy/Mental Status: Alert, Normal Affect, Normal Mood





- Problem List Review


Problem List Initiated/Reviewed/Updated: Yes





- Plan


Plan:: 


Assessment/Plan:


Acute:


Atrial Fib S/p RVR


   - HR is now controlled and off Cardizem drip


   - She is currently on Cardizem 90 mg po Q8H (may have issues with compliance)


   - Will try if she can be safely put back on her home dose Cardizem of 120 mg 

po daily starting in AM 


   - Continue home dose BB and Warfarin for stroke prophylaxis 


   - INR Therapeutic at 2





S/p Pancreatitis


   - Lipase 426 ---> 261


   - Most likely from gallstone +/- Abnormal triglycerides level 


   - Ca level is low; she is not on diuretics


   - Abdominal CT scan showed increased density in gallbladder most likely due 

to gallstones; Abdominal U/S IN AM 


   - Lipid Panel on 3/17/2017: Trig 189, Cholesterol 2298, , HLD 72- she 

is not on statin; repeat level in AM 


   - She is tolerating clear liquid diet; advance as tolerated 





Water Diarrhea


   - She was having loose bowel movement initially


   - Likely from oral contrast but will r/o C. Diff


   - Stool WBC and Rotavirus Ag screening-both negative





Hypothyroidism


   - TSH 9.194 and FT4 1.46


   - She is not absorbing adequately will increase thyroid dose 112 mcg for MTW 

regimen


   - Repeat labs in 3 months outpatient





Abnormal CT scan Findings


   - Not New: Cystic lesion on pancreatic head and several hyperenhacing 

lesions within the liver felt to be benign and stable per radiology report


   - Patient made aware; she had these previously noted on Chest/Abdomen CT 

scan dated 4/3/2017


   - PCP to continue to monitor  


   


Resolved:


S/p Dehydration


   - UA spec gravity shows 1.025





Chronic:


HTN


Hx/o HLD not on statin


CKD Stage 3, Stable


Hx of Breast CA





Plan:


She is clinically stable


Transfer to [facility] with Tele


Advance clear liquids as tolerated 


Routine AM Labs


Avoid fatty/greasy meals


D/c Daily LA and Lipase level


Abdominal US in AM re: gallstones/pancreas


Ambulate as tolerated 


DVT/GI prophylaxis: Warfarin and H2B


Continue PT/OT


Additional orders as above


D/c once she is able to tolerate regular solid meal i.e. 1-2 days

## 2018-04-02 RX ADMIN — SODIUM CHLORIDE PRN MG: 9 INJECTION, SOLUTION INTRAVENOUS at 21:54

## 2018-04-02 RX ADMIN — SODIUM CHLORIDE PRN MG: 9 INJECTION, SOLUTION INTRAVENOUS at 13:42

## 2018-04-02 RX ADMIN — DILTIAZEM HYDROCHLORIDE SCH MG: 120 CAPSULE, COATED, EXTENDED RELEASE ORAL at 09:52

## 2018-04-02 NOTE — US
Abdominal ultrasound: Multiple real-time images of the abdomen were 

obtained.

 

Comparison: Prior CT abdomen and pelvis exam of 03/31/18 and right 

upper quadrant abdominal ultrasound of 04/18/17.

 

Technologist's note: Patient unable to breathe for exam

 

Findings: Cystic area is seen within the pancreatic head measuring 1.9

 x 0.9 x 2.6 cm.  This was noted on prior CT exam.  No additional 

abnormality is appreciated within the pancreas.  Lesion noted on prior

 CT exam within the liver is not definitely appreciated by ultrasound 

exam.  Multiple gallstones are seen within the gallbladder.  No 

gallbladder wall thickening or biliary duct dilatation is seen.  

Kidneys show no hydronephrosis or mass.  Right kidney has a length of 

9.8 cm and left kidney has a length of 10.3 cm.  Inferior vena cava is

 patent.  Portal vein shows normal hepatopedal flow.  Aorta shows no 

aneurysmal dilatation.  Spleen size is normal.

 

Impression:

1.  Liver lesion seen on recent CT exam not identified by ultrasound 

exam.

2.  Multiple gallstones without gallbladder wall thickening or biliary

 duct dilatation.

3.  Cystic area within the head of the pancreas which was noted on 

prior CT exam believed to represent a benign cystic tumor.

4.  No additional abnormality is appreciated on abdominal ultrasound 

exam.

 

Diagnostic code #3

## 2018-04-03 RX ADMIN — DILTIAZEM HYDROCHLORIDE SCH MG: 120 CAPSULE, COATED, EXTENDED RELEASE ORAL at 09:15

## 2018-04-03 NOTE — PCM.PN
- General Info


Date of Service: 04/03/18


Admission Dx/Problem (Free Text): 


 Admission Diagnosis/Problem





Admission Diagnosis/Problem      Pancreatitis








Subjective Update: 


Follow Up





Functional Status: Reports: Pain Controlled, Tolerating Diet, Ambulating, 

Urinating





- Review of Systems


General: Denies: Fever, Weakness, Fatigue, Malaise, Chills


HEENT: Reports: No Symptoms


Pulmonary: Denies: Shortness of Breath, Pleuritic Chest Pain, Cough


Cardiovascular: Denies: No Symptoms, Chest Pain, Palpitations, Dyspnea on 

Exertion, Lightheadedness


Gastrointestinal: Denies: Abdominal Pain, Nausea, Vomiting


Genitourinary: Reports: No Symptoms


Musculoskeletal: Reports: No Symptoms


Skin: Denies: Cyanosis, Jaundice, Mottled, Pallor, Diaphoresis, Bruising


Neurological: Denies: Confusion, Difficulty Walking, Weakness, Gait Disturbance


Psychiatric: Denies: Depression, Anxiety, Agitation, Hallucinations


Systems Review Comment:: 


She feels better and slept good last bright. She still reports having "hot and 

cold" sensation but not as bad as yesterday. She also reports having shortness 

of breath yesterday with exertion and PT session. Her morning labs are fairly 

stable except for slightly low K and Ca+.       





- Patient Data


Vitals - Most Recent: 


 Last Vital Signs











Temp  36.7 C   04/03/18 07:58


 


Pulse  70   04/03/18 09:16


 


Resp  20   04/03/18 07:58


 


BP  143/85 H  04/03/18 09:16


 


Pulse Ox  94 L  04/03/18 07:58











Weight - Most Recent: 74.752 kg


I&O - Last 24 Hours: 


 Intake & Output











 04/02/18 04/03/18 04/03/18





 22:59 06:59 14:59


 


Intake Total 1021 712 120


 


Output Total 500 550 


 


Balance 521 162 120











Lab Results Last 24 Hours: 


 Laboratory Results - last 24 hr











  04/03/18 04/03/18 04/03/18 Range/Units





  05:53 05:53 05:53 


 


WBC  7.90    (3.98-10.04)  K/mm3


 


RBC  3.72 L    (3.98-5.22)  M/mm3


 


Hgb  11.2    (11.2-15.7)  gm/L


 


Hct  34.3    (34.1-44.9)  %


 


MCV  92.2    (79.4-94.8)  fl


 


MCH  30.1    (25.6-32.2)  pg


 


MCHC  32.7    (32.2-35.5)  g/dl


 


RDW Std Deviation  48.6 H    (36.4-46.3)  fL


 


Plt Count  186    (182-369)  K/mm3


 


MPV  10.0    (9.4-12.3)  fl


 


Neut % (Auto)  73.4 H    (34.0-71.1)  %


 


Lymph % (Auto)  13.4 L    (19.3-51.7)  %


 


Mono % (Auto)  12.3    (4.7-12.5)  %


 


Eos % (Auto)  0.5 L    (0.7-5.8)  


 


Baso % (Auto)  0.1    (0.1-1.2)  %


 


Neut # (Auto)  5.80    (1.56-6.13)  K/mm3


 


Lymph # (Auto)  1.06 L    (1.18-3.74)  K/mm3


 


Mono # (Auto)  0.97 H    (0.24-0.36)  K/mm3


 


Eos # (Auto)  0.04    (0.04-0.36)  K/mm3


 


Baso # (Auto)  0.01    (0.01-0.08)  K/mm3


 


PT    29.8 H  (8.0-13.0)  SECONDS


 


INR    2.76  


 


Sodium   141   (136-145)  mEq/L


 


Potassium   3.4 L   (3.5-5.1)  mEq/L


 


Chloride   105   ()  mEq/L


 


Carbon Dioxide   24   (21-32)  mEq/L


 


Anion Gap   15.4 H   (5-15)  


 


BUN   9   (7-18)  mg/dL


 


Creatinine   0.8   (0.55-1.02)  mg/dL


 


Est Cr Clr Drug Dosing   44.58   mL/min


 


Estimated GFR (MDRD)   > 60   (>60)  mL/min


 


BUN/Creatinine Ratio   11.3 L   (14-18)  


 


Glucose   96   ()  mg/dL


 


Calcium   7.1 L   (8.5-10.1)  mg/dL


 


C-Reactive Protein   5.6 H*   (<1.0)  mg/dL











Zechariah Results Last 24 Hours: 


 Microbiology











 03/31/18 09:54 Streptococcus pneumoniae Antigen (M - Final





 Urine 











Med Orders - Current: 


 Current Medications





Acetaminophen (Tylenol)  650 mg PO Q4H PRN


   PRN Reason: Fever


Calcium Carbonate/Glycine (Calcium Carbonate)  1,200 mg PO BIDMEALS [facility]


   Last Admin: 04/03/18 06:19 Dose:  1,200 mg


Diltiazem HCl (Cardizem Cd)  120 mg PO DAILY [facility]


   Last Admin: 04/03/18 09:15 Dose:  120 mg


Famotidine (Pepcid)  20 mg PO DAILY [facility]


   Last Admin: 04/03/18 09:16 Dose:  20 mg


Levothyroxine Sodium (Levothyroxine)  112 mcg PO SuFrSa@0600 [facility]


   Last Admin: 04/01/18 06:05 Dose:  112 mcg


Levothyroxine Sodium (Synthroid)  100 mcg PO MoTuWeTh@0600 [facility]


   Last Admin: 04/03/18 06:19 Dose:  100 mcg


Metoprolol Tartrate (Lopressor)  50 mg PO BID [facility]


   Last Admin: 04/03/18 09:16 Dose:  50 mg


Ondansetron HCl (Zofran)  4 mg IVPUSH Q8H PRN


   PRN Reason: Nausea/Vomiting


   Last Admin: 04/02/18 21:54 Dose:  4 mg


Sodium Chloride (Saline Flush)  10 ml FLUSH ASDIRECTED PRN


   PRN Reason: Keep Vein Open


   Last Admin: 03/31/18 01:58 Dose:  10 ml


Warfarin Sodium (Coumadin)  5 mg PO DAILY@1800 [facility]


   Last Admin: 04/02/18 18:19 Dose:  5 mg





Discontinued Medications





Diltiazem HCl (Diltiazem)  10 mg IVPUSH ONETIME ONE


   Stop: 03/31/18 03:37


   Last Admin: 03/31/18 03:40 Dose:  10 mg


Diltiazem HCl (Diltiazem)  10 mg IVPUSH ONETIME ONE


   Stop: 03/31/18 04:59


   Last Admin: 03/31/18 05:05 Dose:  10 mg


Diltiazem HCl (Cardizem)  90 mg PO Q8HR [facility]


   Last Admin: 04/01/18 13:47 Dose:  90 mg


Diltiazem HCl (Cardizem)  90 mg PO Q12HR [facility]


   Stop: 04/01/18 22:00


   Last Admin: 04/01/18 21:06 Dose:  90 mg


Famotidine (Pepcid)  20 mg PO BID [facility]


   Last Admin: 04/01/18 21:08 Dose:  20 mg


Sodium Chloride (Normal Saline)  1,000 mls @ 1,000 mls/hr IV .BOLUS STA


   Stop: 03/31/18 02:49


   Last Admin: 03/31/18 01:59 Dose:  1,000 mls/hr


Sodium Chloride (Normal Saline)  1,000 mls @ 125 mls/hr IV ASDIRECTED [facility]


   Last Admin: 03/31/18 03:41 Dose:  125 mls/hr


Diltiazem HCl 125 mg/ Sodium (Chloride)  125 mls @ 5 mls/hr IV TITRATE [facility]; 

Protocol


   Last Titration: 03/31/18 11:37 Dose:  0 mg/hr, 0 mls/hr


Dextrose/Sodium Chloride (Dextrose 5%-Normal Saline)  1,000 mls @ 125 mls/hr IV 

ASDIRECTED [facility]


Sodium Chloride (Normal Saline)  1,000 mls @ 100 mls/hr IV ASDIRECTED [facility]


   Last Admin: 04/03/18 02:05 Dose:  75 mls/hr


Iopamidol (Isovue-370 (76%))  100 ml IVPUSH ONETIME ONE


   Stop: 03/31/18 04:11


   Last Admin: 03/31/18 04:30 Dose:  100 ml


Levothyroxine Sodium (Synthroid)  100 mcg PO MoTuWeTh@0600 [facility]


Levothyroxine Sodium (Levothyroxine)  112 mcg PO MoTuWeTh@0600 [facility]


   Last Admin: 04/02/18 06:00 Dose:  112 mcg


Levothyroxine Sodium (Synthroid)  100 mcg PO MoTuWeTh@0600 [facility]


Metoclopramide HCl (Reglan)  10 mg IVPUSH ONETIME ONE


   Stop: 03/31/18 03:22


   Last Admin: 03/31/18 03:37 Dose:  10 mg


Metoprolol Tartrate (Lopressor)  50 mg PO ONETIME ONE


   Stop: 03/31/18 10:13


   Last Admin: 03/31/18 10:31 Dose:  50 mg


Ondansetron HCl (Zofran)  4 mg IVPUSH ONETIME ONE


   Stop: 03/31/18 01:51


   Last Admin: 03/31/18 01:59 Dose:  4 mg


Sodium Chloride (Saline Flush)  10 ml FLUSH ONETIME PRN


   PRN Reason: IV FLUSH


   Last Admin: 03/31/18 04:30 Dose:  10 ml











- Exam


General: Alert, Oriented, Cooperative, No Acute Distress


HEENT: Pupils Equal, Pupils Reactive, EOMI, Mucous Membr. Moist/Pink


Neck: Supple, Trachea Midline, No JVD


Lungs: Clear to Auscultation, Normal Respiratory Effort


Cardiovascular: Irregular Rhythm


GI/Abdominal Exam: Normal Bowel Sounds, Soft, Non-Tender, No Organomegaly, No 

Distention, No Abnormal Bruit, No Mass


 (Female) Exam: Deferred


Back Exam: Normal Inspection, Decreased Range of Motion


Extremities: Normal Inspection, Normal Range of Motion, Non-Tender, No Pedal 

Edema, Normal Capillary Refill


Peripheral Pulses: 2+: Dorsalis Pedis (L), Dorsalis Pedis (R)


Skin: Warm, Dry, Intact


Neurological: No New Focal Deficit


Psy/Mental Status: Alert, Normal Affect, Normal Mood





- Problem List Review


Problem List Initiated/Reviewed/Updated: Yes





- My Orders


Last 24 Hours: 


My Active Orders





04/02/18 23:59


Levothyroxine [Synthroid]   100 mcg PO MoTuWeTh@0600 














- Plan


Plan:: 


Assessment/Plan:


Acute:


Atrial Fib S/p RVR


   - HR is now controlled and off Cardizem drip


   - She is currently on Cardizem 90 mg po Q8H (may have issues with compliance)


   - Will try if she can be safely put back on her home dose Cardizem of 120 mg 

po daily starting in AM 


   - Continue home dose BB and Warfarin for stroke prophylaxis 


   - INR Therapeutic at 2.7





Hypothyroidism


   - TSH 9.194 and FT4 1.46


   - She is not absorbing adequately will increase thyroid dose 112 mcg for MTW 

regimen


   - Repeat labs in 3 months outpatient





Abnormal CT scan Findings


   - Not New: Cystic lesion on pancreatic head and several hyperenhacing 

lesions within the liver felt to be benign and stable per radiology report


   - Patient made aware; she had these previously noted on Chest/Abdomen CT 

scan dated 4/3/2017


   - PCP to continue to monitor  





Electrolyte Abnormality


   - K 3.4 and Ca 7.1


   - 2/2 inadequate intake


   - Replete and monitor 


   


Resolved:


S/p Dehydration


   - UA spec gravity shows 1.025





S/p Pancreatitis


   - Lipase 426 ---> 261


   - Most likely from gallstone +/- Abnormal triglycerides level 


   - Ca level is low; she is not on diuretics


   - Abdominal CT scan showed increased density in gallbladder most likely due 

to gallstones; Abdominal U/S IN AM 


   - Lipid Panel on 3/17/2017: Trig 189, Cholesterol 2298, , HLD 72- she 

is not on statin; repeat level in AM 


   - She is tolerating clear liquid diet; advance as tolerated 





S/p Water Diarrhea


   - She was having loose bowel movement initially


   - Likely from oral contrast but will r/o C. Diff


   - Stool WBC and Rotavirus Ag screening-both negative





Chronic:


HTN


Hx/o HLD not on statin


CKD Stage 3, Stable


Hx of Breast CA





Plan:


She remains clinically stable


Routine AM Labs


Abdominal US: multiple gallstones w/o gallbladder wall thickening or biliary 

duct dilatation


Ambulate as tolerated 


DVT/GI prophylaxis: Warfarin and H2B


Continue PT/OT


Additional orders as above


D/c in AM

## 2018-04-04 VITALS — DIASTOLIC BLOOD PRESSURE: 80 MMHG | SYSTOLIC BLOOD PRESSURE: 144 MMHG

## 2018-04-04 RX ADMIN — DILTIAZEM HYDROCHLORIDE SCH MG: 120 CAPSULE, COATED, EXTENDED RELEASE ORAL at 08:16

## 2018-04-04 NOTE — PCM.DCSUM1
**Discharge Summary





- Hospital Course


HPI Initial Comments: 


87 year old female who felt well until 2100 hour, and became nauseated.  

Associated with the nausea was abdominal pain and vomiting.  She denies chest 

pain or SOB.  However admits to palpitations and was found to be in A Fib with 

RVR in the ED.  The patient had been seen in [place] where she lives.  She does 

not have a consistent provider.  The patient did not participate freely with 

the history portion of the exam.  When asked about what she eats, she replied, 

"This and that." Dietary restriction to avoid gall bladder attacks were poorly 

received without her active participation.  She denied fever or chills as well 

as no recent change in appetite.  She will be admitted to the ICU, and has been 

started on a Cardizem drip. 





- Discharge Data


Discharge Date: 04/04/18 (Admit date3/31/18)


Discharge Disposition: Home, Self-Care 01


Condition: Good





- Discharge Diagnosis/Problem(s)


(1) Hypothyroidism


SNOMED Code(s): 57249906


   ICD Code: E03.9 - HYPOTHYROIDISM, UNSPECIFIED   Status: Acute   Priority: 

High   Current Visit: Yes   





(2) Atrial fibrillation with RVR


SNOMED Code(s): 282597042573388


   ICD Code: I48.91 - UNSPECIFIED ATRIAL FIBRILLATION   Status: Resolved   

Priority: High   Current Visit: Yes   





(3) Dehydration


SNOMED Code(s): 87347152


   ICD Code: E86.0 - DEHYDRATION   Status: Resolved   Priority: High   Current 

Visit: Yes   





(4) Diarrhea


SNOMED Code(s): 00115528


   ICD Code: R19.7 - DIARRHEA, UNSPECIFIED   Status: Resolved   Priority: High 

  Current Visit: Yes   





(5) Hypocalcemia


SNOMED Code(s): 1408798


   ICD Code: E83.51 - HYPOCALCEMIA   Status: Resolved   Priority: High   

Current Visit: Yes   





(6) Hypokalemia


SNOMED Code(s): 40619849


   ICD Code: E87.6 - HYPOKALEMIA   Status: Resolved   Priority: High   Current 

Visit: Yes   





- Patient Summary/Data


Labs Pending at D/C: 


None 





Recommended Follow-up Testing/Procedures: 


Follow-up with PCP in 7-10 days after discharge.





Follow-up with PCP and pharmacy about change of dose with levothyroxine. This 

was apparently never picked up from pharmacy.





Recommend recheck TSH in 3 months 








Hospital Course: 


Assessment/Plan:


Acute:


Atrial Fib S/p RVR


   - HR is now controlled and off Cardizem drip


   - She is currently on Cardizem 90 mg po Q8H (may have issues with compliance)


   - Will try if she can be safely put back on her home dose Cardizem of 120 mg 

po daily starting in AM 


   - Continue home dose BB and Warfarin for stroke prophylaxis 


   - INR Therapeutic at 2.7





Hypothyroidism


   - TSH 9.194 and FT4 1.46


   - She is possibly not absorbing adequately 


   - Repeat labs in 3 months outpatient


   - Confusion over dosing - apparently never picked up after new dose change


   - PCP to follow-up and adjust in future 





Abnormal CT scan Findings


   - Not New: Cystic lesion on pancreatic head and several hyperenhacing 

lesions within the liver felt to be benign and stable per radiology report


   - Patient made aware; she had these previously noted on Chest/Abdomen CT 

scan dated 4/3/2017


   - PCP to continue to monitor  


   


Resolved:


S/p Dehydration


   - UA spec gravity shows 1.025





S/p Pancreatitis


   - Lipase 426 ---> 261


   - Most likely from gallstone +/- Abnormal triglycerides level 


   - Ca level is low; she is not on diuretics


   - Abdominal CT scan showed increased density in gallbladder most likely due 

to gallstones; Abdominal U/S IN AM 


   - Lipid Panel on 3/17/2017: Trig 189, Cholesterol 2298, , HLD 72- she 

is not on statin; repeat level in AM 


   - She is tolerating clear liquid diet; advance as tolerated 





S/p Water Diarrhea


   - She was having loose bowel movement initially


   - Likely from oral contrast but will r/o C. Diff


   - Stool WBC and Rotavirus Ag screening-both negative





S/P Electrolyte Abnormality


   - K 3.4 and Ca 7.1


   - 2/2 inadequate intake


   - Replete and monitor 





Chronic:


HTN


Hx/o HLD not on statin


CKD Stage 3, Stable


Hx of Breast CA





Plan:


She remains clinically stable


Routine AM Labs


Abdominal US: multiple gallstones w/o gallbladder wall thickening or biliary 

duct dilatation


Ambulate as tolerated 


DVT/GI prophylaxis: Warfarin and H2B


Continue PT/OT


Additional orders as above


D/c in AM 





Overall Whit did quite well. She was removed from the cardizem drip and HR 

remained controlled. We are questioning compliance with her medications as her 

TSH was reportedly very high as well. Contacted patients PCP as there was some 

confusion with levothyroxine dosage. Apparently the patient had a dose change 

awhile ago with Tere Crowell in [place] and never picked up the new 

medication. This change is reflected in the discharge medications and she will 

need to follow up with the pharmacy and her PCP as the medication is still 

pending. She did have some diarrhea while here which is likely the result of 

contrast from the CT scan. Electrolytes were abnormal and were replaced. She 

did have an elevated lipase however she responded well to treatment and her 

lipase level returned to normal. She will be discharged home today with her 

normal medications as noted above. She should be watched closely as we have 

some concern over non-compliance with medications. 





- Patient Instructions


Diet: Heart Healthy Diet


Activity: As Tolerated


Showering/Bathing: May Shower


Notify Provider of: Fever, Increased Pain, Nausea and/or Vomiting





- Discharge Plan


Home Medications: 


 Home Meds





Diltiazem HCl [Diltiazem 24Hr Cd] 120 mg PO DAILY 04/23/15 [History]


Levothyroxine 112 mcg PO SUFRSA 04/23/15 [History]


Metoprolol Tartrate [Lopressor] 50 mg PO BID 04/23/15 [History]


Warfarin Sodium 5 mg PO ASDIRECTED 04/23/15 [History]


Levothyroxine [Synthroid] 100 mcg PO MOTUWETH 03/31/18 [History]








Patient Handouts:  Acute Pancreatitis, Easy-to-Read, Hypokalemia, Hypothyroidism

, Hypocalcemia, Adult, Dehydration, Adult, Easy-to-Read, Atrial Fibrillation, 

Easy-to-Read


Forms:  ED Department Discharge


Referrals: 


Bernice Frye, NP [Nurse Practitioner] - 





- Discharge Summary/Plan Comment


DC Time >30 min.: Yes (45 Mins )





- General Info


Date of Service: 04/04/18


Admission Dx/Problem (Free Text: 


 Admission Diagnosis/Problem





Admission Diagnosis/Problem      Pancreatitis








Subjective Update: 


In to see Whit. She is sitting in the chair and doing well. No concerns by 

patient or nursing. She slept ok. She has had a good appetite but reports the 

food has not been great. She will be discharged today. 





Functional Status: Reports: Pain Controlled, Tolerating Diet, Ambulating, 

Urinating.  Denies: New Symptoms





- Review of Systems


General: Reports: No Symptoms.  Denies: Fever, Weakness, Fatigue


HEENT: Reports: No Symptoms


Pulmonary: Reports: No Symptoms.  Denies: Shortness of Breath, Cough, Sputum


Cardiovascular: Reports: No Symptoms.  Denies: Chest Pain, Palpitations, 

Dyspnea on Exertion, Lightheadedness


Gastrointestinal: Reports: No Symptoms.  Denies: Abdominal Pain, Constipation, 

Diarrhea, Nausea, Vomiting


Genitourinary: Reports: No Symptoms


Musculoskeletal: Reports: No Symptoms


Skin: Reports: No Symptoms


Neurological: Reports: No Symptoms


Psychiatric: Reports: No Symptoms





- Patient Data


Vitals - Most Recent: 


 Last Vital Signs











Temp  98.1 F   04/04/18 04:39


 


Pulse  63   04/04/18 04:39


 


Resp  18   04/04/18 04:39


 


BP  158/62 H  04/04/18 04:39


 


Pulse Ox  93 L  04/04/18 04:39











Weight - Most Recent: 166 lb 4 oz


I&O - Last 24 hours: 


 Intake & Output











 04/03/18 04/04/18 04/04/18





 22:59 06:59 14:59


 


Intake Total 1510 500 


 


Output Total 450 500 


 


Balance 1060 0 











Lab Results - Last 24 hrs: 


 Laboratory Results - last 24 hr











  04/03/18 04/04/18 04/04/18 Range/Units





  05:53 05:33 05:33 


 


WBC   7.14   (3.98-10.04)  K/mm3


 


RBC   3.68 L   (3.98-5.22)  M/mm3


 


Hgb   11.3   (11.2-15.7)  gm/L


 


Hct   33.4 L   (34.1-44.9)  %


 


MCV   90.8   (79.4-94.8)  fl


 


MCH   30.7   (25.6-32.2)  pg


 


MCHC   33.8   (32.2-35.5)  g/dl


 


RDW Std Deviation   47.2 H   (36.4-46.3)  fL


 


Plt Count   185   (182-369)  K/mm3


 


MPV   10.0   (9.4-12.3)  fl


 


Neut % (Auto)   71.8 H   (34.0-71.1)  %


 


Lymph % (Auto)   14.1 L   (19.3-51.7)  %


 


Mono % (Auto)   12.5   (4.7-12.5)  %


 


Eos % (Auto)   1.3   (0.7-5.8)  


 


Baso % (Auto)   0.3   (0.1-1.2)  %


 


Neut # (Auto)   5.13   (1.56-6.13)  K/mm3


 


Lymph # (Auto)   1.01 L   (1.18-3.74)  K/mm3


 


Mono # (Auto)   0.89 H   (0.24-0.36)  K/mm3


 


Eos # (Auto)   0.09   (0.04-0.36)  K/mm3


 


Baso # (Auto)   0.02   (0.01-0.08)  K/mm3


 


PT  29.8 H    (8.0-13.0)  SECONDS


 


INR  2.76    


 


Sodium    139  (136-145)  mEq/L


 


Potassium    3.2 L  (3.5-5.1)  mEq/L


 


Chloride    103  ()  mEq/L


 


Carbon Dioxide    24  (21-32)  mEq/L


 


Anion Gap    15.2 H  (5-15)  


 


BUN    10  (7-18)  mg/dL


 


Creatinine    0.8  (0.55-1.02)  mg/dL


 


Est Cr Clr Drug Dosing    44.58  mL/min


 


Estimated GFR (MDRD)    > 60  (>60)  mL/min


 


BUN/Creatinine Ratio    12.5 L  (14-18)  


 


Glucose    92  ()  mg/dL


 


Calcium    7.1 L  (8.5-10.1)  mg/dL


 


C-Reactive Protein    6.8 H*  (<1.0)  mg/dL











LIZ Results - Last 24 hrs: 


 Microbiology











 03/31/18 09:54 Streptococcus pneumoniae Antigen (M - Final





 Urine 











Med Orders - Current: 


 Current Medications





Acetaminophen (Tylenol)  650 mg PO Q4H PRN


   PRN Reason: Fever


   Last Admin: 04/03/18 23:43 Dose:  650 mg


Calcium Carbonate/Glycine (Calcium Carbonate)  1,200 mg PO BIDMEALS [facility]


   Last Admin: 04/04/18 06:16 Dose:  1,200 mg


Diltiazem HCl (Cardizem Cd)  120 mg PO DAILY [facility]


   Last Admin: 04/03/18 09:15 Dose:  120 mg


Famotidine (Pepcid)  20 mg PO DAILY [facility]


   Last Admin: 04/03/18 09:16 Dose:  20 mg


Levothyroxine Sodium (Levothyroxine)  112 mcg PO SuFrSa@0600 [facility]


   Last Admin: 04/01/18 06:05 Dose:  112 mcg


Levothyroxine Sodium (Synthroid)  100 mcg PO MoTuWeTh@0600 [facility]


   Last Admin: 04/04/18 06:16 Dose:  100 mcg


Magnesium Sulfate (Pharmacy To Dose - Magnesium Replacement)  1 dose .XX 

ASDIRECTED [facility]


Metoprolol Tartrate (Lopressor)  50 mg PO BID [facility]


   Last Admin: 04/03/18 20:17 Dose:  50 mg


Ondansetron HCl (Zofran)  4 mg IVPUSH Q8H PRN


   PRN Reason: Nausea/Vomiting


   Last Admin: 04/02/18 21:54 Dose:  4 mg


Potassium Chloride (Pharmacy To Dose - Potassium Replacement)  1 dose .XX 

ASDIRECTED [facility]


Potassium Chloride (Klor-Con M20)  40 meq PO ONETIME ONE


   Stop: 04/04/18 08:01


Sodium Chloride (Saline Flush)  10 ml FLUSH ASDIRECTED PRN


   PRN Reason: Keep Vein Open


   Last Admin: 03/31/18 01:58 Dose:  10 ml





Discontinued Medications





Diltiazem HCl (Diltiazem)  10 mg IVPUSH ONETIME ONE


   Stop: 03/31/18 03:37


   Last Admin: 03/31/18 03:40 Dose:  10 mg


Diltiazem HCl (Diltiazem)  10 mg IVPUSH ONETIME ONE


   Stop: 03/31/18 04:59


   Last Admin: 03/31/18 05:05 Dose:  10 mg


Diltiazem HCl (Cardizem)  90 mg PO Q8HR [facility]


   Last Admin: 04/01/18 13:47 Dose:  90 mg


Diltiazem HCl (Cardizem)  90 mg PO Q12HR [facility]


   Stop: 04/01/18 22:00


   Last Admin: 04/01/18 21:06 Dose:  90 mg


Famotidine (Pepcid)  20 mg PO BID [facility]


   Last Admin: 04/01/18 21:08 Dose:  20 mg


Sodium Chloride (Normal Saline)  1,000 mls @ 1,000 mls/hr IV .BOLUS STA


   Stop: 03/31/18 02:49


   Last Admin: 03/31/18 01:59 Dose:  1,000 mls/hr


Sodium Chloride (Normal Saline)  1,000 mls @ 125 mls/hr IV ASDIRECTED [facility]


   Last Admin: 03/31/18 03:41 Dose:  125 mls/hr


Diltiazem HCl 125 mg/ Sodium (Chloride)  125 mls @ 5 mls/hr IV TITRATE [facility]; 

Protocol


   Last Titration: 03/31/18 11:37 Dose:  0 mg/hr, 0 mls/hr


Dextrose/Sodium Chloride (Dextrose 5%-Normal Saline)  1,000 mls @ 125 mls/hr IV 

ASDIRECTED [facility]


Sodium Chloride (Normal Saline)  1,000 mls @ 100 mls/hr IV ASDIRECTED [facility]


   Last Admin: 04/03/18 02:05 Dose:  75 mls/hr


Iopamidol (Isovue-370 (76%))  100 ml IVPUSH ONETIME ONE


   Stop: 03/31/18 04:11


   Last Admin: 03/31/18 04:30 Dose:  100 ml


Levothyroxine Sodium (Synthroid)  100 mcg PO MoTuWeTh@0600 EZEKIEL


Levothyroxine Sodium (Levothyroxine)  112 mcg PO MoTuWeTh@0600 EZEKIEL


   Last Admin: 04/02/18 06:00 Dose:  112 mcg


Levothyroxine Sodium (Synthroid)  100 mcg PO MoTuWeTh@0600 EZEKIEL


Metoclopramide HCl (Reglan)  10 mg IVPUSH ONETIME ONE


   Stop: 03/31/18 03:22


   Last Admin: 03/31/18 03:37 Dose:  10 mg


Metoprolol Tartrate (Lopressor)  50 mg PO ONETIME ONE


   Stop: 03/31/18 10:13


   Last Admin: 03/31/18 10:31 Dose:  50 mg


Ondansetron HCl (Zofran)  4 mg IVPUSH ONETIME ONE


   Stop: 03/31/18 01:51


   Last Admin: 03/31/18 01:59 Dose:  4 mg


Sodium Chloride (Saline Flush)  10 ml FLUSH ONETIME PRN


   PRN Reason: IV FLUSH


   Last Admin: 03/31/18 04:30 Dose:  10 ml


Warfarin Sodium (Coumadin)  5 mg PO DAILY@1800 EZEKIEL


   Last Admin: 04/03/18 18:12 Dose:  5 mg











- Exam


Quality Assessment: Reports: DVT Prophylaxis


General: Reports: Alert, Oriented, Cooperative, No Acute Distress


HEENT: Reports: Pupils Equal, Pupils Reactive, EOMI, Mucous Membr. Moist/Pink


Neck: Reports: Supple, Trachea Midline


Lungs: Reports: Clear to Auscultation, Normal Respiratory Effort


Cardiovascular: Reports: Irregular Rhythm


GI/Abdominal Exam: Normal Bowel Sounds, Soft, Non-Tender, No Organomegaly, No 

Distention, No Abnormal Bruit, No Mass, Pelvis Stable


 (Female) Exam: Deferred


Rectal (Female) Exam: Deferred


Back Exam: Reports: Normal Inspection, Full Range of Motion


Extremities: Normal Inspection, Normal Range of Motion, Non-Tender, No Pedal 

Edema, Normal Capillary Refill


Skin: Reports: Warm, Dry, Intact


Psy/Mental Status: Reports: Alert, Normal Affect, Normal Mood

## 2018-04-04 NOTE — PCM.PN
- General Info


Date of Service: 04/02/18


Admission Dx/Problem (Free Text): 


 Admission Diagnosis/Problem





Admission Diagnosis/Problem      Pancreatitis








Subjective Update: 


In to see Whit. She is sitting in the chair and doing well. No concerns by 

patient or nursing. She slept ok. She has had a good appetite but reports the 

food has not been great. She will be discharged today. 





Functional Status: Reports: Pain Controlled, Tolerating Diet, Ambulating, 

Urinating





- Review of Systems


General: Denies: Fever, Weakness, Fatigue, Malaise, Chills


HEENT: Reports: No Symptoms


Pulmonary: Denies: Shortness of Breath


Cardiovascular: Denies: Chest Pain


Gastrointestinal: Denies: Abdominal Pain, Nausea, Vomiting


Genitourinary: Reports: No Symptoms


Musculoskeletal: Reports: No Symptoms


Skin: Denies: Cyanosis, Jaundice, Mottled, Pallor, Diaphoresis, Pruritis


Neurological: Denies: Confusion, Difficulty Walking, Weakness, Gait Disturbance


Psychiatric: Denies: Depression, Anxiety, Agitation, Hallucinations


Systems Review Comment:: 


No significant overnight or acute issues. She feels better this morning. She 

has no complaints.








- Patient Data


Vitals - Most Recent: 


 Last Vital Signs











Temp  36.6 C   04/04/18 07:47


 


Pulse  88   04/04/18 08:16


 


Resp  18   04/04/18 07:47


 


BP  144/80 H  04/04/18 08:16


 


Pulse Ox  93 L  04/04/18 07:47











Weight - Most Recent: 75.41 kg


I&O - Last 24 Hours: 


 Intake & Output











 04/04/18 04/04/18 04/04/18





 06:59 14:59 22:59


 


Intake Total 500 220 


 


Output Total 500  


 


Balance 0 220 











Lab Results Last 24 Hours: 


 Laboratory Results - last 24 hr











  04/04/18 04/04/18 Range/Units





  05:33 05:33 


 


WBC  7.14   (3.98-10.04)  K/mm3


 


RBC  3.68 L   (3.98-5.22)  M/mm3


 


Hgb  11.3   (11.2-15.7)  gm/L


 


Hct  33.4 L   (34.1-44.9)  %


 


MCV  90.8   (79.4-94.8)  fl


 


MCH  30.7   (25.6-32.2)  pg


 


MCHC  33.8   (32.2-35.5)  g/dl


 


RDW Std Deviation  47.2 H   (36.4-46.3)  fL


 


Plt Count  185   (182-369)  K/mm3


 


MPV  10.0   (9.4-12.3)  fl


 


Neut % (Auto)  71.8 H   (34.0-71.1)  %


 


Lymph % (Auto)  14.1 L   (19.3-51.7)  %


 


Mono % (Auto)  12.5   (4.7-12.5)  %


 


Eos % (Auto)  1.3   (0.7-5.8)  


 


Baso % (Auto)  0.3   (0.1-1.2)  %


 


Neut # (Auto)  5.13   (1.56-6.13)  K/mm3


 


Lymph # (Auto)  1.01 L   (1.18-3.74)  K/mm3


 


Mono # (Auto)  0.89 H   (0.24-0.36)  K/mm3


 


Eos # (Auto)  0.09   (0.04-0.36)  K/mm3


 


Baso # (Auto)  0.02   (0.01-0.08)  K/mm3


 


Sodium   139  (136-145)  mEq/L


 


Potassium   3.2 L  (3.5-5.1)  mEq/L


 


Chloride   103  ()  mEq/L


 


Carbon Dioxide   24  (21-32)  mEq/L


 


Anion Gap   15.2 H  (5-15)  


 


BUN   10  (7-18)  mg/dL


 


Creatinine   0.8  (0.55-1.02)  mg/dL


 


Est Cr Clr Drug Dosing   44.58  mL/min


 


Estimated GFR (MDRD)   > 60  (>60)  mL/min


 


BUN/Creatinine Ratio   12.5 L  (14-18)  


 


Glucose   92  ()  mg/dL


 


Calcium   7.1 L  (8.5-10.1)  mg/dL


 


C-Reactive Protein   6.8 H*  (<1.0)  mg/dL











Med Orders - Current: 


 Current Medications








Discontinued Medications





Acetaminophen (Tylenol)  650 mg PO Q4H PRN


   PRN Reason: Fever


   Last Admin: 04/03/18 23:43 Dose:  650 mg


Calcium Carbonate/Glycine (Calcium Carbonate)  1,200 mg PO BIDMEALS EZEKIEL


   Last Admin: 04/04/18 06:16 Dose:  1,200 mg


Diltiazem HCl (Diltiazem)  10 mg IVPUSH ONETIME ONE


   Stop: 03/31/18 03:37


   Last Admin: 03/31/18 03:40 Dose:  10 mg


Diltiazem HCl (Diltiazem)  10 mg IVPUSH ONETIME ONE


   Stop: 03/31/18 04:59


   Last Admin: 03/31/18 05:05 Dose:  10 mg


Diltiazem HCl (Cardizem)  90 mg PO Q8HR EZEKIEL


   Last Admin: 04/01/18 13:47 Dose:  90 mg


Diltiazem HCl (Cardizem Cd)  120 mg PO DAILY EZEKIEL


   Last Admin: 04/04/18 08:16 Dose:  120 mg


Diltiazem HCl (Cardizem)  90 mg PO Q12HR EZEKIEL


   Stop: 04/01/18 22:00


   Last Admin: 04/01/18 21:06 Dose:  90 mg


Famotidine (Pepcid)  20 mg PO BID EZEKIEL


   Last Admin: 04/01/18 21:08 Dose:  20 mg


Famotidine (Pepcid)  20 mg PO DAILY [facility]


   Last Admin: 04/04/18 08:16 Dose:  20 mg


Sodium Chloride (Normal Saline)  1,000 mls @ 1,000 mls/hr IV .BOLUS STA


   Stop: 03/31/18 02:49


   Last Admin: 03/31/18 01:59 Dose:  1,000 mls/hr


Sodium Chloride (Normal Saline)  1,000 mls @ 125 mls/hr IV ASDIRECTED EZEKIEL


   Last Admin: 03/31/18 03:41 Dose:  125 mls/hr


Diltiazem HCl 125 mg/ Sodium (Chloride)  125 mls @ 5 mls/hr IV TITRATE EZEKIEL; 

Protocol


   Last Titration: 03/31/18 11:37 Dose:  0 mg/hr, 0 mls/hr


Dextrose/Sodium Chloride (Dextrose 5%-Normal Saline)  1,000 mls @ 125 mls/hr IV 

ASDIRECTED EZEKIEL


Sodium Chloride (Normal Saline)  1,000 mls @ 100 mls/hr IV ASDIRECTED EZEKIEL


   Last Admin: 04/03/18 02:05 Dose:  75 mls/hr


Iopamidol (Isovue-370 (76%))  100 ml IVPUSH ONETIME ONE


   Stop: 03/31/18 04:11


   Last Admin: 03/31/18 04:30 Dose:  100 ml


Levothyroxine Sodium (Synthroid)  100 mcg PO MoTuWeTh@0600 [facility]


Levothyroxine Sodium (Levothyroxine)  112 mcg PO SuFrSa@0600 [facility]


   Last Admin: 04/01/18 06:05 Dose:  112 mcg


Levothyroxine Sodium (Levothyroxine)  112 mcg PO MoTuWeTh@0600 [facility]


   Last Admin: 04/02/18 06:00 Dose:  112 mcg


Levothyroxine Sodium (Synthroid)  100 mcg PO MoTuWeTh@0600 [facility]


   Last Admin: 04/04/18 06:16 Dose:  100 mcg


Levothyroxine Sodium (Synthroid)  100 mcg PO MoTuWeTh@0600 [facility]


Magnesium Sulfate (Pharmacy To Dose - Magnesium Replacement)  1 dose .XX 

ASDIRECTED [facility]


Metoclopramide HCl (Reglan)  10 mg IVPUSH ONETIME ONE


   Stop: 03/31/18 03:22


   Last Admin: 03/31/18 03:37 Dose:  10 mg


Metoprolol Tartrate (Lopressor)  50 mg PO ONETIME ONE


   Stop: 03/31/18 10:13


   Last Admin: 03/31/18 10:31 Dose:  50 mg


Metoprolol Tartrate (Lopressor)  50 mg PO BID [facility]


   Last Admin: 04/04/18 08:16 Dose:  50 mg


Ondansetron HCl (Zofran)  4 mg IVPUSH ONETIME ONE


   Stop: 03/31/18 01:51


   Last Admin: 03/31/18 01:59 Dose:  4 mg


Ondansetron HCl (Zofran)  4 mg IVPUSH Q8H PRN


   PRN Reason: Nausea/Vomiting


   Last Admin: 04/02/18 21:54 Dose:  4 mg


Potassium Chloride (Pharmacy To Dose - Potassium Replacement)  1 dose .XX 

ASDIRECTED [facility]


Potassium Chloride (Klor-Con M20)  40 meq PO ONETIME ONE


   Stop: 04/04/18 08:01


   Last Admin: 04/04/18 08:17 Dose:  40 meq


Sodium Chloride (Saline Flush)  10 ml FLUSH ASDIRECTED PRN


   PRN Reason: Keep Vein Open


   Last Admin: 03/31/18 01:58 Dose:  10 ml


Sodium Chloride (Saline Flush)  10 ml FLUSH ONETIME PRN


   PRN Reason: IV FLUSH


   Last Admin: 03/31/18 04:30 Dose:  10 ml


Warfarin Sodium (Coumadin)  5 mg PO DAILY@1800 [facility]


   Last Admin: 04/03/18 18:12 Dose:  5 mg











- Exam


General: Alert, Oriented, Cooperative, No Acute Distress


HEENT: Pupils Reactive, EOMI, Mucous Membr. Moist/Pink.  No: Pupils Equal


Neck: Supple, Trachea Midline


Lungs: Clear to Auscultation, Normal Respiratory Effort


Cardiovascular: Irregular Rhythm


GI/Abdominal Exam: Normal Bowel Sounds, Soft, Non-Tender, No Organomegaly, No 

Distention, No Abnormal Bruit, No Mass


 (Female) Exam: Deferred


Back Exam: Normal Inspection, Decreased Range of Motion


Extremities: Normal Inspection, Normal Range of Motion, Non-Tender, No Pedal 

Edema, Normal Capillary Refill


Peripheral Pulses: 2+: Dorsalis Pedis (L), Dorsalis Pedis (R)


Skin: Warm, Dry, Intact


Neurological: No New Focal Deficit


Psy/Mental Status: Alert, Normal Affect, Normal Mood





- Problem List Review


Problem List Initiated/Reviewed/Updated: Yes





- Plan


Plan:: 


Assessment/Plan:


Acute:


Atrial Fib S/p RVR


   - HR remains controlled 


   - Resume home dose Cardizem of 120 mg po daily  


   - Continue home dose BB and Warfarin for stroke prophylaxis 


   - INR Therapeutic at 2.3





Hypothyroidism


   - TSH 9.194 and FT4 1.46


   - She had issues with "hot and coldness" after receiving her adjusted 

thyroid medication


   - Will monitor 


   - Repeat labs in 3 months outpatient





Abnormal CT scan Findings


   - Not New: Cystic lesion on pancreatic head and several hyperenhacing 

lesions within the liver felt to be benign and stable per radiology report


   - Patient made aware; she had these previously noted on Chest/Abdomen CT 

scan dated 4/3/2017


   - PCP to continue to monitor  





Electrolyte Abnormality


   - Ca 6.9


   - 2/2 inadequate intake


   - Replete and monitor 


   


Resolved:


S/p Dehydration


   - UA spec gravity shows 1.025





S/p Pancreatitis


   - Lipase 426 ---> 261


   - Most likely from gallstone +/- Abnormal triglycerides level 


   - Ca level is low; she is not on diuretics


   - Abdominal CT scan showed increased density in gallbladder most likely due 

to gallstones; Abdominal U/S IN AM 


   - Lipid Panel on 3/17/2017: Trig 189, Cholesterol 2298, , HLD 72- she 

is not on statin; repeat level in AM 


   - She is tolerating clear liquid diet; advance as tolerated 





S/p Water Diarrhea


   - She was having loose bowel movement initially


   - Likely from oral contrast but will r/o C. Diff


   - Stool WBC and Rotavirus Ag screening-both negative





Chronic:


HTN


Hx/o HLD not on statin


CKD Stage 3, Stable


Hx of Breast CA





Plan:


She remains clinically stable


Routine AM Labs


Abdominal US: multiple gallstones w/o gallbladder wall thickening or biliary 

duct dilatation


Ambulate as tolerated 


DVT/GI prophylaxis: Warfarin and H2B


Continue PT/OT


Additional orders as above


Possible d/c in AM if she is stable otherwise she may need to stay for one more 

day

## 2018-08-16 ENCOUNTER — HOSPITAL ENCOUNTER (EMERGENCY)
Dept: HOSPITAL 41 - JD.ED | Age: 83
Discharge: HOME | End: 2018-08-16
Payer: MEDICARE

## 2018-08-16 VITALS — DIASTOLIC BLOOD PRESSURE: 95 MMHG | SYSTOLIC BLOOD PRESSURE: 150 MMHG

## 2018-08-16 DIAGNOSIS — Z79.899: ICD-10-CM

## 2018-08-16 DIAGNOSIS — I48.91: ICD-10-CM

## 2018-08-16 DIAGNOSIS — I15.9: ICD-10-CM

## 2018-08-16 DIAGNOSIS — R53.1: Primary | ICD-10-CM

## 2018-08-16 DIAGNOSIS — E20.9: ICD-10-CM

## 2018-08-16 DIAGNOSIS — E03.9: ICD-10-CM

## 2018-08-16 PROCEDURE — 99285 EMERGENCY DEPT VISIT HI MDM: CPT

## 2018-08-16 PROCEDURE — 36415 COLL VENOUS BLD VENIPUNCTURE: CPT

## 2018-08-16 PROCEDURE — 84484 ASSAY OF TROPONIN QUANT: CPT

## 2018-08-16 PROCEDURE — 93005 ELECTROCARDIOGRAM TRACING: CPT

## 2018-08-16 PROCEDURE — 81001 URINALYSIS AUTO W/SCOPE: CPT

## 2018-08-16 NOTE — EDM.PDOC
ED HPI GENERAL MEDICAL PROBLEM





- General


Chief Complaint: Neurological Problem


Stated Complaint: WEAKNESS/ FAINT


Time Seen by Provider: 08/16/18 14:50


Source of Information: Reports: Patient


History Limitations: Reports: No Limitations





- History of Present Illness


INITIAL COMMENTS - FREE TEXT/NARRATIVE: 


Patient is a 87-year-old female with a history of A. fib and 

hypoparathyroidism. Patient was walking out of a restaurant today and felt dizzy

/lightheaded. Patient got into her vehicle and drove herself to the clinic. 

Symptoms resolved. She was evaluated by endocrinologist the end of June 2018. 

She is supposed to be on a daily injection for her hypocalcemia and parathyroid 

issues. Patient is unable to administer the shot herself. Since then she's gone 

without it. She knows her current complaints is associated with her 

parathyroidism. She has known low calcium blood levels and has been taking 

calcium replacement. Upon admission to the ED today she complains of some 

weakness. During this event she denied any stroke like symptoms. She had no 

chest pain. No shortness of breath. No vision changes. She denies any fever, 

chills, shortness of breath, chest pain, nause/avomiting, recent syncopal 

episode, abdominal pain, dysuria, or any additional complaints. No muscle 

spasms noted.   I did speak to patient's PCP at [facility]. They are in 

contact with Dr. John endocrinologist to see if injections Monday through 

Friday at the clinic would be sufficient. Patient was initially supposed to be 

on it 7 days a week.





Patient has a history of hypothyroidism, hypo-calcium, hypothyroidism, A. fib, 

heart failure, hypertension, hypokalemia, cholesteremia, and chronically 

anticoagulated.








- Related Data


 Allergies











Allergy/AdvReac Type Severity Reaction Status Date / Time


 


No Known Allergies Allergy   Verified 08/16/18 14:29











Home Meds: 


 Home Meds





Diltiazem HCl [Diltiazem 24Hr Cd] 120 mg PO DAILY 04/23/15 [History]


Levothyroxine 112 mcg PO SUFRSA 04/23/15 [History]


Metoprolol Tartrate [Lopressor] 50 mg PO BID 04/23/15 [History]


Warfarin Sodium 5 mg PO ASDIRECTED 04/23/15 [History]


Levothyroxine [Synthroid] 100 mcg PO MOTUWETH 03/31/18 [History]











Past Medical History


Cardiovascular History: Reports: Afib, Hypertension


Endocrine/Metabolic History: Reports: Hypothyroidism


Oncologic (Cancer) History: Reports: Breast





- Past Surgical History


Cardiovascular Surgical History: Reports: None


Oncologic Surgical History: Reports: Mastectomy


Other Oncologic Surgeries/Procedures: right breast





Social & Family History





- Family History


Family Medical History: Noncontributory





- Caffeine Use


Caffeine Use: Reports: Coffee





ED ROS GENERAL





- Review of Systems


Review Of Systems: ROS reveals no pertinent complaints other than HPI.





ED EXAM, GENERAL





- Physical Exam


Exam: See Below


Exam Limited By: No Limitations


General Appearance: Alert, WD/WN, No Apparent Distress


Eye Exam: Bilateral Eye: EOMI, Nystagmus (none noted), PERRL


Ears: Hearing Grossly Normal


Nose: Normal Inspection


Throat/Mouth: Normal Inspection, Normal Oropharynx, Normal Voice, No Airway 

Compromise


Neck: Normal Inspection, Supple


Respiratory/Chest: No Respiratory Distress, Lungs Clear, Normal Breath Sounds, 

No Accessory Muscle Use, Chest Non-Tender


Cardiovascular: Normal Peripheral Pulses, Systolic Murmur, Irregularly Irregular


Peripheral Pulses: 2+: Radial (L), Radial (R)


GI/Abdominal: Normal Bowel Sounds, Soft, Non-Tender, No Organomegaly, No 

Distention


Back Exam: Normal Inspection, Full Range of Motion


Extremities: Normal Inspection, Normal Range of Motion, Non-Tender, No Pedal 

Edema, Normal Capillary Refill


Neurological: Alert, Oriented, CN II-XII Intact, Normal Cognition, No Motor/

Sensory Deficits


Psychiatric: Normal Affect, Normal Mood


Skin Exam: Warm, Dry, Intact, Normal Color, No Rash





Course





- Vital Signs


Last Recorded V/S: 


 Last Vital Signs











Temp  98.5 F   08/16/18 14:29


 


Pulse  72   08/16/18 19:11


 


Resp      


 


BP  150/95 H  08/16/18 19:11


 


Pulse Ox  96   08/16/18 14:29








 





Orthostatic Blood Pressure [     180/89


Standing]                        


Orthostatic Blood Pressure [     161/85


Sitting]                         


Orthostatic Blood Pressure [     151/84


Supine]                          











- Orders/Labs/Meds


Orders: 


 Active Orders 24 hr











 Category Date Time Status


 


 EKG 12 Lead [EKG Documentation Completion] [RC] STAT Care  08/16/18 15:10 

Active


 


 Orthostatic Vital Signs [RC] ASDIRECTED Care  08/16/18 15:06 Active











Labs: 


 Laboratory Tests











  08/16/18 08/16/18 Range/Units





  15:33 15:55 


 


Troponin I  < 0.017   (0.00-0.056)  ng/mL


 


Urine Color   Yellow  (Yellow)  


 


Urine Appearance   Clear  (Clear)  


 


Urine pH   7.0  (5.0-8.0)  


 


Ur Specific Gravity   1.015  (1.005-1.030)  


 


Urine Protein   Negative  (Negative)  


 


Urine Glucose (UA)   Negative  (Negative)  


 


Urine Ketones   Negative  (Negative)  


 


Urine Occult Blood   1+ H  (Negative)  


 


Urine Nitrite   Negative  (Negative)  


 


Urine Bilirubin   Negative  (Negative)  


 


Urine Urobilinogen   0.2  (0.2-1.0)  


 


Ur Leukocyte Esterase   Negative  (Negative)  


 


Urine RBC   0-5  (0-5)  /hpf


 


Urine WBC   0-5  (0-5)  /hpf


 


Ur Epithelial Cells   5-10 H  (0-5)  /hpf


 


Urine Bacteria   Not seen  (FEW)  /hpf


 


Urine Mucus   Not seen  (FEW)  /hpf











Meds: 


Medications














Discontinued Medications














Generic Name Dose Route Start Last Admin





  Trade Name Freq  PRN Reason Stop Dose Admin


 


Metoprolol Tartrate  50 mg  08/16/18 17:34  08/16/18 18:18





  Lopressor  PO  08/16/18 17:35  50 mg





  ONETIME ONE   Administration





     





     





     





     














- Re-Assessments/Exams


Free Text/Narrative Re-Assessment/Exam: 








Patient has had blood work obtained today at the [facility].





Labs reviewed: White blood cell count 8.05, hemoglobin 13.7, platelet count 279

, INR 2.47, sodium 132, potassium 4.2, hCG 17.2, creatinine 0.9, glucose 112, 

free T4 1 0.68, TSH 1.362, calcium 9.0.





UA, troponin, and orthostatic vitals will be obtained. 





EKG: Atrial fibrillation rate of 68 variable no acute ST changes.





UA positive occult blood. Urine epithelial cells 5-10. 





Orthostatic vital signs were negative.





08/16/18 17:33 Reassessment, patient states with returning back to the room 

after ambulation she had this pressure sensation to her head and didn't feel 

that great. Blood pressure was elevated 180 systolic. On reassessment patient's 

blood pressure is 180/72. She is worried about being home by herself if this 

occurs again. I advised her that if if admitted and started on the injections. 

Most likely would need to look at placement for assisted living center to have 

injections administered since nothing has been arranged. Home health is not 

available at [place].  Again she is adamant about not residing in a assisted 

living care facility. She does have family that is close which she does not 

want to call in them or help. She does not want to manage the shots herself. 

She has a tough enough time in managing the other medications she is currently 

on. We are still awaiting to see if  the patient can take the  shots 5 days out 

of 7 by a endocrinologist.  Blood pressure was elevated and she has not taken 

her evening medications. Normally takes all her meds at 9:00. I have ordered 

metoprolol 50mg PO in hopes the blood pressure is the culprit.  





1950 Reassessment, patients bp is 159/81.  She has no symptoms at this time.  

States the pressure to her head maybe associated to the wig she is wearing.  

States at times it is tight.  She is ready to go home.  Will get patient up and 

ambulate to see how she does.  She is planning on seeing her PCP tomorrow for 

reevaluation and determine plan for injections.  In addition her BP was 

elevated in the E.D. thus will have her check this regularly with log kept with 

the plan medication modification maybe required.  Patient agrees with plan to 

discharge home.  Daughter will stay with her this evening.  The patient 

remained hemodynamically stable while under my care in the E.D. I discussed the 

concerning symptoms for which to return to the E.D. with the patient/family. 

The patient/family verbalized understanding. All questions were answered. 





Patient was walked again with no issues.  /95 HR72.  Patient has no 

complaints at this time.  She is feeling much better.  








Departure





- Departure


Time of Disposition: 19:03


Disposition: Home, Self-Care 01


Condition: Good


Clinical Impression: 


 Generalized weakness





Hypertension


Qualifiers:


 Hypertension type: unspecified secondary hypertension Qualified Code(s): I15.9 

- Secondary hypertension, unspecified





Hypoparathyroidism


Qualifiers:


 Hypoparathyroidism type: unspecified Qualified Code(s): E20.9 - 

Hypoparathyroidism, unspecified





Instructions:  Weakness, Easy-to-Read, How to Take Your Blood Pressure, DASH 

Eating Plan, Hypoparathyroidism, Hypertension, Easy-to-Read, Managing Your 

Hypertension, Dehydration, Elderly, Easy-to-Read


Referrals: 


PCP,None [Ordering Only Provider] - 


Forms:  ED Department Discharge


Additional Instructions: 


Follow-up with PCP tomorrow to discuss further plan for injections. Ensure 

adequate oral intake of fluids. Ensure balanced diet. If you become dizzy with 

position changes please lay or sit back down so the subsides. Allow yourself 

ample time to equalize with body position changes so that she did not become 

dizzy and/or pass out. Continue taking all your home medications as prescribed. 

Please return back to the ED if you develop any new or worsening symptoms.





Checked your blood pressure every other day at different times during the day. 

Allow yourself approximately 15 minutes to relax prior to taking. Do not take 

if in pain, with increased anxiety, or just had exercised.  Keep a log with the 

blood pressure readings. Take your blood pressure machine and log with you to 

your next appointment with PCP to ensure blood pressure machine is calibrated 

appropriately. 





- My Orders


Last 24 Hours: 


My Active Orders





08/16/18 15:06


Orthostatic Vital Signs [RC] ASDIRECTED 





08/16/18 15:10


EKG 12 Lead [EKG Documentation Completion] [RC] STAT 














- Assessment/Plan


Last 24 Hours: 


My Active Orders





08/16/18 15:06


Orthostatic Vital Signs [RC] ASDIRECTED 





08/16/18 15:10


EKG 12 Lead [EKG Documentation Completion] [RC] STAT

## 2020-05-27 NOTE — CR
Chest: Portable view of the chest was obtained.

 

Comparison: Prior chest x-ray of 09/09/19.

 

Heart is enlarged.  Lungs are clear with no acute parenchymal change. 

 Bony structures are grossly intact.  Surgical clips are seen at the 

base of the neck.  Surgical clips are also seen within the right 

axillary region.

 

Impression:

1.  Cardiomegaly.

2.  Nothing acute is otherwise seen.

 

Diagnostic code #2

 

Study was dictated in MDT

## 2020-05-27 NOTE — EDM.PDOC
ED HPI GENERAL MEDICAL PROBLEM





- General


Chief Complaint: Gastrointestinal Problem


Stated Complaint: BLOOD IN STOOL/WEAK


Time Seen by Provider: 05/27/20 15:05


Source of Information: Reports: Patient


History Limitations: Reports: No Limitations





- History of Present Illness


INITIAL COMMENTS - FREE TEXT/NARRATIVE: 


89-year-old female presents to the ED for evaluation of dark black tarry stools 

for the last 3 to 4 days.  She is currently residing in the [facility] in [place].  

She has associated shortness of breath on exertion she appreciates this 

particular when she walks.  She could not do her daily walk the last day or 2 

because of shortness of breath and weakness in her lower extremities.  She 

denies any chest pain.  Denies any cough or sputum production.  She appreciates 

that stools are black and tarry with no bright red blood or clots.  She denies 

any abdominal pain or particularly any dysphagia or pain with eating.  Think 

she is ever had an ulcer in her stomach.  Of note she is in chronic atrial 

fibrillation with good rate control.  She is on Coumadin for anticoagulation 

and the last INR done on 14 May was 2.3.  Thus far as we know her Coumadin 

dosage has not been changed.  States that she has had for some form of surgery 

on her bowel in the past.  She could not clarify this for us.  She believes 

that she has had up to 5 black tarry stools per day.  She states sometimes they 

will come back to back like 2 or 3 in a row.  Not waking up at night to have a 

bowel mom.  Patient was seen in [facility] this morning and her occult blood 

for her stool was positive.  As far as I can ascertain no other labs were 

ordered or at least not available in our lab at this time.





Onset: Gradual


Onset Date: 05/23/20


Duration: Day(s):, Getting Worse, Intermittent


Location: Reports: Other (Intermittent dark black tarry stools per rectum.)


Quality: Reports: Other (She states she is weak and short of breath and 

slightly lightheaded at times.  Worse with exertion such as walking.)


Severity: Moderate


Improves with: Reports: Rest


Worsens with: Reports: Other


Context: Reports: Other (Continuous black tarry stools for the last 3 to 4 days)

.  Denies: Activity (Appreciable symptoms with walking and standing), Exercise, 

Lifting, Sick Contact, Trauma


Associated Symptoms: Reports: Malaise.  Denies: Confusion, Chest Pain, Cough, 

cough w sputum, Diaphoresis, Fever/Chills, Headaches, Loss of Appetite, Nausea/

Vomiting, Rash, Seizure, Shortness of Breath, Syncope


Treatments PTA: Reports: Other (see below) (None.)





- Related Data


 Allergies











Allergy/AdvReac Type Severity Reaction Status Date / Time


 


No Known Allergies Allergy   Verified 05/27/20 22:00











Home Meds: 


 Home Meds





calcitrioL [Calcitriol] 0.25 mcg PO BID 10/12/18 [History]


Potassium Chloride 10 meq PO DAILY 11/20/18 [History]


Triamcinolone Acetonide [Triamcinolone Acetonide 0.1% Crm] 1 applic TOP 

ASDIRECTED 12/24/18 [History]


Warfarin [Coumadin] 5 mg PO ASDIRECTED 12/24/18 [History]


Metoprolol Tartrate 25 mg PO BID #60 tablet 12/27/18 [Rx]


Calcium Carbonate [Calcium] 600 mg PO DAILY 05/27/20 [History]


Ferrous Sulfate [Slow Fe] 65 tab PO DAILY 05/27/20 [History]


Furosemide [Lasix] 20 mg PO DAILY 05/27/20 [History]


Levothyroxine [Synthroid] 100 mcg PO ACBREAKFAST 05/27/20 [History]


Magnesium Oxide 250 mg PO BID 05/27/20 [History]


Psyllium Husk [Fiber] 0.4 gm PO DAILY 05/27/20 [History]


Simvastatin 10 mg PO DAILY 05/27/20 [History]


Vit C/E/Zn/Coppr/Lutein/Zeaxan [Preservision Areds 2 Softgel] 1 tab PO BID 05/27 /20 [History]


Warfarin Sodium 2.5 mg PO ASDIRECTED 05/27/20 [History]


dilTIAZem HCL [Diltiazem 12Hr ER] 120 mg PO DAILY 05/27/20 [History]











Past Medical History


HEENT History: Reports: Impaired Vision (Does wear eyeglasses), Macular 

Degeneration


Cardiovascular History: Reports: Afib (Chronic atrial fibrillation.  Rate 

controlled with metoprolol and diltiazem.  She is on Coumadin.), High 

Cholesterol, Hypertension, SOB on Exertion


Other OB/GYN History: hysterectomy, tubal ligation


Musculoskeletal History: Reports: Arthritis


Endocrine/Metabolic History: Reports: Hypoparathyroidism (Currently she patient 

has had surgical removal of her parathyroid glands.), Hypothyroidism, 

Multinodular Thyroid, Osteoporosis (  She has chronic hypocalcemia), Vitamin D 

Deficiency


Oncologic (Cancer) History: Reports: Breast


Dermatologic History: Reports: Psoriasis





- Infectious Disease History


Infectious Disease History: Reports: Chicken Pox, Measles





- Past Surgical History


HEENT Surgical History: Reports: Cataract Surgery, Tonsillectomy


Cardiovascular Surgical History: Reports: None


Female  Surgical History: Reports: Hysterectomy, Salpingo-Oophorectomy, Tubal 

Ligation


Endocrine Surgical History: Reports: None


Musculoskeletal Surgical History: Reports: None


Oncologic Surgical History: Reports: Mastectomy


Dermatological Surgical History: Reports: None





Social & Family History





- Family History


Family Medical History: Noncontributory





- Caffeine Use


Caffeine Use: Reports: Coffee





- Living Situation & Occupation


Living situation: Reports: , Assisted Living ([facility])


Occupation: Retired





ED ROS GENERAL





- Review of Systems


Review Of Systems: See Below


Constitutional: Reports: Malaise, Weakness, Fatigue, Other (He complains a lot 

of feeling hot and flushed and other times feeling cold.).  Denies: Fever, 

Chills, Decreased Appetite, Weight Loss


HEENT: Reports: Glasses


Respiratory: Reports: Shortness of Breath (Appreciates shortness of breath on 

exertion.  She usually does 2 laps around the building every day for a walk).  

Denies: Wheezing ( and states yesterday she did not think she was going to make 

it back due to shortness of breath and weakness in her lower extremities.), 

Pleuritic Chest Pain, Cough, Sputum, Hemoptysis


Cardiovascular: Reports: Blood Pressure Problem, Dyspnea on Exertion, Edema, 

Lightheadedness, Palpitations (The last few days.  He aware of palpitations 

that she is in chronic atrial fib.).  Denies: Claudication, Orthopnea


Endocrine: Reports: Fatigue, Other (Complains of lower leg cramping 

particularly in the night.)


GI/Abdominal: Reports: Diarrhea, Melena.  Denies: Abdominal Pain, Constipation (

Those are quite loose at times but not diarrhea.), Decreased Appetite, 

Difficulty Swallowing (Also been black and tarry up to 5/day for the last 3 to 

4 days.), Distension, Flatus, Hematemesis, Stool Incontinence


: Reports: Frequency, Incontinence (Both urge and stress components.)


Musculoskeletal: Reports: Neck Pain, Shoulder Pain, Back Pain, Joint Pain (Is 

in hips.)


Skin: Reports: Pallor, Bruising (Is easily as she is on Coumadin.), Other (

Patient has psoriasis for many years.  He states it is under better control 

with newer topical steroid.)


Neurological: Reports: Confusion (She admits that thought processes are perhaps 

slowing down more than the), Dizziness, Difficulty Walking (Due to weakness.), 

Weakness.  Denies: Pre-Existing Deficit, Seizure, Syncope, Tingling, Change in 

Speech, Gait Disturbance


Psychiatric: Reports: No Symptoms


Hematologic/Lymphatic: Reports: Easy Bruising


Immunologic: Reports: No Symptoms





ED EXAM, GI/ABD





- Physical Exam


Exam: See Below


Exam Limited By: No Limitations


General Appearance: Alert, WD/WN, Anxious, Mild Distress, Other (He does appear 

pallid.  Temperature is 36.6 with a pulse of 97 irregularly irregular due to 

chronic atrial fib.  Respiratory of 20 with O2 sats of 96%.  BP was 139/58 

supine.)


Eyes: Bilateral: Pale Conjunctiva (Moderate bilaterally.  Suspect hemoglobin 

less than 9)


Throat/Mouth: Normal Inspection, Normal Lips, Normal Oropharynx


Head: Atraumatic, Normocephalic


Neck: Limited Range of Motion, Tender Lateral, Thyromegaly (Right hemithyroid 

is palpable and slightly enlarged and feels somewhat nodular superior pole.  

Cannot palpate the left lobe of the thyroid I suspect it has been surgically 

removed), Other (Patient it states that she has had surgery on her thyroid x2.  

I can feel an enlarged right lobe of thyroid which feels nodular.  I cannot 

feel left lobe.).  No: Lymphadenopathy (L), Lymphadenopathy (R)


Respiratory/Chest: Lungs Clear, Normal Breath Sounds, No Accessory Muscle Use, 

Respiratory Distress (Mild tachypnea at rest.).  No: Stridor, Pleural Rub, 

Accessory Muscle Use


Cardiovascular: No Gallop, No JVD, Systolic Murmur (Patient has a grade 1 

holosystolic murmur best heard at the left lower sternal border compared with 

aortic valve stenosis.  There is also a very early diastolic murmur suggesting 

mild insufficiency as well.  Radiation of the murmur into the neck.), 

Irregularly Irregular (Patient is in atrial fibrillation with a regular 

irregular heartbeat.).  No: Normal Peripheral Pulses, Bradycardia


GI/Abdominal Exam: Normal Bowel Sounds, Soft, Non-Tender, No Organomegaly, No 

Mass, Pelvis Stable, Distended, Other (And is mildly distended and tympanitic 

to percussion.  She has surgical scars from infraumbilical to pubic symphysis 

as well as a Pfannenstiel incision across the lower abdomen.  Apparently she 

had tubal ligation and total abdominal hysterectomy in the past.  Unclear 

whether she has had any true bowel surgery.)


Rectal (Female) Exam: Black Stool, Heme + Stool


Back Exam: Decreased Range of Motion, Paraspinal Tenderness (Along the lumbar 

spine.), Other (Mild kyphosis thoracic spine.)


Extremities: Pedal Edema (Edema dorsal feet), Limited Range of Motion (To carry 

in both hips with marked decreased ability to internally externally rotate the 

hips.  Dense of arthritic change in both knees.), Other


Neurological: Alert, Oriented, CN II-XII Intact, Normal Cognition, No Motor/

Sensory Deficits.  No: Normal Gait


Psychiatric: Normal Affect, Normal Mood


Skin Exam: Warm, Dry, Ecchymosis (There is ecchymoses of varying age on both 

upper extremities particularly forearms and dorsal hands), Pallor, Other (He 

has psoriatic skin rash particularly lumbar spine elbows and knees.)





EKG INTERPRETATION


EKG Date: 05/27/20


Time: 15:39


Rhythm: A-Fib (Rate of 42 to 115/min)


Axis: LAD-Left Axis Deviation (-19 degrees)


P-Wave: Absent


QRS: Other (Nonspecific interventricular conduction delay.  Q waves V1 to V3 

with near Q wave in V4 suggestive of large previous anteroseptal myocardial 

infarction.)


ST-T: Other (I have inversion in aVL ST segment depression lead I, V5 and V6 

suggesting repolarization abnormality)


QT: Prolonged (QTC is moderately prolonged)





Course





- Vital Signs


Last Recorded V/S: 


 Last Vital Signs











Temp  36.5 C   05/30/20 03:49


 


Pulse  77   05/30/20 03:49


 


Resp  14   05/30/20 03:49


 


BP  127/67   05/30/20 03:49


 


Pulse Ox  92 L  05/30/20 03:49








 





Orthostatic Blood Pressure [     121/89


Standing]                        


Orthostatic Blood Pressure [     136/59


Supine]                          











- Orders/Labs/Meds


Orders: 


 Medication Orders





Benzocaine/Menthol (Cepacol Sore Throat)  1 lozenge MUCMEM Q2HR PRN


   PRN Reason: throat irritation 


   Last Admin: 05/29/20 11:00  Dose: 1 lozenge


Diltiazem HCl (Cardizem Cd)  120 mg PO DAILY [facility]


   Last Admin: 05/29/20 11:00  Dose: 120 mg


Furosemide (Lasix)  20 mg PO DAILY [facility]


Lactated Ringer's (Ringers, Lactated)  1,000 mls @ 75 mls/hr IV ASDIRECTED [facility]


   Last Admin: 05/29/20 19:59  Dose: 75 mls/hr


   Infusion: 05/29/20 18:01  Dose: 75 mls/hr


   Admin: 05/29/20 04:41  Dose: 75 mls/hr


   Infusion: 05/29/20 04:02  Dose: 75 mls/hr


   Admin: 05/28/20 14:42  Dose: 75 mls/hr


   Infusion: 05/28/20 14:36  Dose: 75 mls/hr


   Admin: 05/28/20 01:16  Dose: 75 mls/hr


Levothyroxine Sodium (Synthroid)  100 mcg PO ACBREAKFAST [facility]


   Last Admin: 05/30/20 06:08  Dose: 100 mcg


Melatonin (Melatonin)  9 mg PO BEDTIME [facility]


   Last Admin: 05/29/20 20:03  Dose: 9 mg


   Admin: 05/28/20 20:57  Dose: 9 mg


Metoprolol Tartrate (Lopressor)  25 mg PO BID [facility]


   Last Admin: 05/29/20 20:03  Dose: 25 mg


   Admin: 05/29/20 10:59  Dose: 25 mg


Ondansetron HCl (Zofran Odt)  4 mg PO Q6H PRN


   PRN Reason: nausea, able to take PO


Ondansetron HCl (Zofran)  4 mg IV Q6H PRN


   PRN Reason: Nausea/Vomiting


Ondansetron HCl (Zofran)  4 mg IVPUSH ONETIME PRN


   PRN Reason: Nausea/Vomiting


Pantoprazole Sodium (Protonix***)  40 mg PO Q12H [facility]


   Last Admin: 05/29/20 20:04  Dose: 40 mg








Labs: 


 Laboratory Tests











  05/27/20 05/27/20 05/27/20 Range/Units





  16:00 16:00 16:00 


 


WBC  8.97    (3.98-10.04)  K/mm3


 


RBC  3.03 L    (3.98-5.22)  M/mm3


 


Hgb  9.1 L D    (11.2-15.7)  gm/dl


 


Hct  28.8 L    (34.1-44.9)  %


 


MCV  95.0 H    (79.4-94.8)  fl


 


MCH  30.0    (25.6-32.2)  pg


 


MCHC  31.6 L    (32.2-35.5)  g/dl


 


RDW Std Deviation  48.1 H    (36.4-46.3)  fL


 


Plt Count  264    (182-369)  K/mm3


 


MPV  9.4    (9.4-12.3)  fl


 


Neut % (Auto)  67.8    (34.0-71.1)  %


 


Lymph % (Auto)  19.5    (19.3-51.7)  %


 


Mono % (Auto)  10.1    (4.7-12.5)  %


 


Eos % (Auto)  2.0    (0.7-5.8)  


 


Baso % (Auto)  0.4    (0.1-1.2)  %


 


Neut # (Auto)  6.07    (1.56-6.13)  K/mm3


 


Lymph # (Auto)  1.75    (1.18-3.74)  K/mm3


 


Mono # (Auto)  0.91 H    (0.24-0.36)  K/mm3


 


Eos # (Auto)  0.18    (0.04-0.36)  K/mm3


 


Baso # (Auto)  0.04    (0.01-0.08)  K/mm3


 


ESR     (0-20)  mm/hr


 


PT    23.5 H  (9.7-12.0)  SECONDS


 


INR    2.26  


 


APTT    32 H D  (22-31)  SECONDS


 


Sodium   136   (136-145)  mEq/L


 


Potassium   4.1   (3.5-5.1)  mEq/L


 


Chloride   100   ()  mEq/L


 


Carbon Dioxide   30   (21-32)  mEq/L


 


Anion Gap   10.1   (5-15)  


 


BUN   34 H   (7-18)  mg/dL


 


Creatinine   1.4 H   (0.55-1.02)  mg/dL


 


Est Cr Clr Drug Dosing   TNP   


 


Estimated GFR (MDRD)   35   (>60)  mL/min


 


BUN/Creatinine Ratio   24.3 H   (14-18)  


 


Glucose   91   ()  mg/dL


 


Calcium   10.1   (8.5-10.1)  mg/dL


 


Magnesium   2.4   (1.8-2.4)  mg/dl


 


Total Bilirubin   0.3   (0.2-1.0)  mg/dL


 


AST   30   (15-37)  U/L


 


ALT   35   (14-59)  U/L


 


Alkaline Phosphatase   73   ()  U/L


 


Troponin I   0.018   (0.00-0.056)  ng/mL


 


NT-Pro-B Natriuret Pep     (0-450)  pg/mL


 


Total Protein   7.0   (6.4-8.2)  g/dl


 


Albumin   3.6   (3.4-5.0)  g/dl


 


Globulin   3.4   gm/dL


 


Albumin/Globulin Ratio   1.1   (1-2)  


 


Urine Color     (Yellow)  


 


Urine Appearance     (Clear)  


 


Urine pH     (5.0-8.0)  


 


Ur Specific Gravity     (1.005-1.030)  


 


Urine Protein     (Negative)  


 


Urine Glucose (UA)     (Negative)  


 


Urine Ketones     (Negative)  


 


Urine Occult Blood     (Negative)  


 


Urine Nitrite     (Negative)  


 


Urine Bilirubin     (Negative)  


 


Urine Urobilinogen     (0.2-1.0)  


 


Ur Leukocyte Esterase     (Negative)  


 


Urine RBC     (0-5)  /hpf


 


Urine WBC     (0-5)  /hpf


 


Ur Squamous Epith Cells     (0-5)  /hpf


 


Amorphous Sediment     (NOT SEEN)  /hpf


 


Urine Bacteria     (FEW)  /hpf


 


Urine Mucus     (FEW)  /hpf


 


Blood Type     


 


Gel Antibody Screen     














  05/27/20 05/27/20 05/27/20 Range/Units





  16:00 16:00 16:00 


 


WBC     (3.98-10.04)  K/mm3


 


RBC     (3.98-5.22)  M/mm3


 


Hgb     (11.2-15.7)  gm/dl


 


Hct     (34.1-44.9)  %


 


MCV     (79.4-94.8)  fl


 


MCH     (25.6-32.2)  pg


 


MCHC     (32.2-35.5)  g/dl


 


RDW Std Deviation     (36.4-46.3)  fL


 


Plt Count     (182-369)  K/mm3


 


MPV     (9.4-12.3)  fl


 


Neut % (Auto)     (34.0-71.1)  %


 


Lymph % (Auto)     (19.3-51.7)  %


 


Mono % (Auto)     (4.7-12.5)  %


 


Eos % (Auto)     (0.7-5.8)  


 


Baso % (Auto)     (0.1-1.2)  %


 


Neut # (Auto)     (1.56-6.13)  K/mm3


 


Lymph # (Auto)     (1.18-3.74)  K/mm3


 


Mono # (Auto)     (0.24-0.36)  K/mm3


 


Eos # (Auto)     (0.04-0.36)  K/mm3


 


Baso # (Auto)     (0.01-0.08)  K/mm3


 


ESR  49 H    (0-20)  mm/hr


 


PT     (9.7-12.0)  SECONDS


 


INR     


 


APTT     (22-31)  SECONDS


 


Sodium     (136-145)  mEq/L


 


Potassium     (3.5-5.1)  mEq/L


 


Chloride     ()  mEq/L


 


Carbon Dioxide     (21-32)  mEq/L


 


Anion Gap     (5-15)  


 


BUN     (7-18)  mg/dL


 


Creatinine     (0.55-1.02)  mg/dL


 


Est Cr Clr Drug Dosing     


 


Estimated GFR (MDRD)     (>60)  mL/min


 


BUN/Creatinine Ratio     (14-18)  


 


Glucose     ()  mg/dL


 


Calcium     (8.5-10.1)  mg/dL


 


Magnesium     (1.8-2.4)  mg/dl


 


Total Bilirubin     (0.2-1.0)  mg/dL


 


AST     (15-37)  U/L


 


ALT     (14-59)  U/L


 


Alkaline Phosphatase     ()  U/L


 


Troponin I     (0.00-0.056)  ng/mL


 


NT-Pro-B Natriuret Pep   1957 H   (0-450)  pg/mL


 


Total Protein     (6.4-8.2)  g/dl


 


Albumin     (3.4-5.0)  g/dl


 


Globulin     gm/dL


 


Albumin/Globulin Ratio     (1-2)  


 


Urine Color     (Yellow)  


 


Urine Appearance     (Clear)  


 


Urine pH     (5.0-8.0)  


 


Ur Specific Gravity     (1.005-1.030)  


 


Urine Protein     (Negative)  


 


Urine Glucose (UA)     (Negative)  


 


Urine Ketones     (Negative)  


 


Urine Occult Blood     (Negative)  


 


Urine Nitrite     (Negative)  


 


Urine Bilirubin     (Negative)  


 


Urine Urobilinogen     (0.2-1.0)  


 


Ur Leukocyte Esterase     (Negative)  


 


Urine RBC     (0-5)  /hpf


 


Urine WBC     (0-5)  /hpf


 


Ur Squamous Epith Cells     (0-5)  /hpf


 


Amorphous Sediment     (NOT SEEN)  /hpf


 


Urine Bacteria     (FEW)  /hpf


 


Urine Mucus     (FEW)  /hpf


 


Blood Type    A POSITIVE  


 


Gel Antibody Screen    Negative  














  05/27/20 Range/Units





  16:15 


 


WBC   (3.98-10.04)  K/mm3


 


RBC   (3.98-5.22)  M/mm3


 


Hgb   (11.2-15.7)  gm/dl


 


Hct   (34.1-44.9)  %


 


MCV   (79.4-94.8)  fl


 


MCH   (25.6-32.2)  pg


 


MCHC   (32.2-35.5)  g/dl


 


RDW Std Deviation   (36.4-46.3)  fL


 


Plt Count   (182-369)  K/mm3


 


MPV   (9.4-12.3)  fl


 


Neut % (Auto)   (34.0-71.1)  %


 


Lymph % (Auto)   (19.3-51.7)  %


 


Mono % (Auto)   (4.7-12.5)  %


 


Eos % (Auto)   (0.7-5.8)  


 


Baso % (Auto)   (0.1-1.2)  %


 


Neut # (Auto)   (1.56-6.13)  K/mm3


 


Lymph # (Auto)   (1.18-3.74)  K/mm3


 


Mono # (Auto)   (0.24-0.36)  K/mm3


 


Eos # (Auto)   (0.04-0.36)  K/mm3


 


Baso # (Auto)   (0.01-0.08)  K/mm3


 


ESR   (0-20)  mm/hr


 


PT   (9.7-12.0)  SECONDS


 


INR   


 


APTT   (22-31)  SECONDS


 


Sodium   (136-145)  mEq/L


 


Potassium   (3.5-5.1)  mEq/L


 


Chloride   ()  mEq/L


 


Carbon Dioxide   (21-32)  mEq/L


 


Anion Gap   (5-15)  


 


BUN   (7-18)  mg/dL


 


Creatinine   (0.55-1.02)  mg/dL


 


Est Cr Clr Drug Dosing   


 


Estimated GFR (MDRD)   (>60)  mL/min


 


BUN/Creatinine Ratio   (14-18)  


 


Glucose   ()  mg/dL


 


Calcium   (8.5-10.1)  mg/dL


 


Magnesium   (1.8-2.4)  mg/dl


 


Total Bilirubin   (0.2-1.0)  mg/dL


 


AST   (15-37)  U/L


 


ALT   (14-59)  U/L


 


Alkaline Phosphatase   ()  U/L


 


Troponin I   (0.00-0.056)  ng/mL


 


NT-Pro-B Natriuret Pep   (0-450)  pg/mL


 


Total Protein   (6.4-8.2)  g/dl


 


Albumin   (3.4-5.0)  g/dl


 


Globulin   gm/dL


 


Albumin/Globulin Ratio   (1-2)  


 


Urine Color  Light yellow  (Yellow)  


 


Urine Appearance  Cloudy H  (Clear)  


 


Urine pH  7.5  (5.0-8.0)  


 


Ur Specific Gravity  1.020  (1.005-1.030)  


 


Urine Protein  Negative  (Negative)  


 


Urine Glucose (UA)  Negative  (Negative)  


 


Urine Ketones  Negative  (Negative)  


 


Urine Occult Blood  Trace-intact H  (Negative)  


 


Urine Nitrite  Negative  (Negative)  


 


Urine Bilirubin  Negative  (Negative)  


 


Urine Urobilinogen  0.2  (0.2-1.0)  


 


Ur Leukocyte Esterase  1+ H  (Negative)  


 


Urine RBC  0-5  (0-5)  /hpf


 


Urine WBC  5-10 H  (0-5)  /hpf


 


Ur Squamous Epith Cells  >100 H  (0-5)  /hpf


 


Amorphous Sediment  Many H  (NOT SEEN)  /hpf


 


Urine Bacteria  Few  (FEW)  /hpf


 


Urine Mucus  Not seen  (FEW)  /hpf


 


Blood Type   


 


Gel Antibody Screen   











Meds: 


Medications











Generic Name Dose Route Start Last Admin





  Trade Name Freq  PRN Reason Stop Dose Admin


 


Benzocaine/Menthol  1 lozenge  05/29/20 10:25  05/29/20 11:00





  Cepacol Sore Throat  MUCMEM   1 lozenge





  Q2HR PRN   Administration





  throat irritation    





     





     





     


 


Diltiazem HCl  120 mg  05/29/20 10:45  05/29/20 11:00





  Cardizem Cd  PO   120 mg





  DAILY EZEKIEL   Administration





     





     





     





     


 


Furosemide  20 mg  05/30/20 09:00  





  Lasix  PO   





  DAILY EZEKIEL   





     





     





     





     


 


Lactated Ringer's  1,000 mls @ 75 mls/hr  05/27/20 18:30  05/29/20 19:59





  Ringers, Lactated  IV   75 mls/hr





  ASDIRECTED EZEKIEL   Administration





     





     





     





     


 


Levothyroxine Sodium  100 mcg  05/30/20 06:00  05/30/20 06:08





  Synthroid  PO   100 mcg





  ACBREAKFAST EZEKIEL   Administration





     





     





     





     


 


Melatonin  9 mg  05/28/20 21:00  05/29/20 20:03





  Melatonin  PO   9 mg





  BEDTIME EZEKIEL   Administration





     





     





     





     


 


Metoprolol Tartrate  25 mg  05/29/20 10:45  05/29/20 20:03





  Lopressor  PO   25 mg





  BID EZEKIEL   Administration





     





     





     





     


 


Ondansetron HCl  4 mg  05/27/20 18:18  





  Zofran Odt  PO   





  Q6H PRN   





  nausea, able to take PO   





     





     





     


 


Ondansetron HCl  4 mg  05/27/20 18:18  





  Zofran  IV   





  Q6H PRN   





  Nausea/Vomiting   





     





     





     


 


Ondansetron HCl  4 mg  05/29/20 09:49  





  Zofran  IVPUSH   





  ONETIME PRN   





  Nausea/Vomiting   





     





     





     


 


Pantoprazole Sodium  40 mg  05/29/20 21:00  05/29/20 20:04





  Protonix***  PO   40 mg





  Q12H EZEKIEL   Administration





     





     





     





     














Discontinued Medications














Generic Name Dose Route Start Last Admin





  Trade Name Freq  PRN Reason Stop Dose Admin


 


Ephedrine Sulfate  Confirm  05/29/20 09:08  





  Ephedrine 25 Mg/5 Ml Syringe  Administered  05/29/20 09:09  





  Dose   





  25 mg   





  IV   





  .STK-MED ONE   





     





     





     





     


 


Sodium Chloride  1,000 mls @ 100 mls/hr  05/27/20 15:30  05/27/20 16:15





  Normal Saline  IV   100 mls/hr





  ASDIRECTED EZEKIEL   Administration





     





     





     





     


 


Lidocaine HCl  Confirm  05/29/20 07:11  





  Xylocaine-Mpf 1%  Administered  05/29/20 07:12  





  Dose   





  4 mls @ as directed   





  .ROUTE   





  .STK-MED ONE   





     





     





     





     


 


Lactated Ringer's  Confirm  05/29/20 08:37  





  Ringers, Lactated  Administered  05/29/20 08:38  





  Dose   





  1,000 mls @ as directed   





  .ROUTE   





  .STK-MED ONE   





     





     





     





     


 


Ketamine HCl  Confirm  05/29/20 08:24  





  Ketalar  Administered  05/29/20 08:25  





  Dose   





  500 mg   





  .ROUTE   





  .STK-MED ONE   





     





     





     





     


 


Miscellaneous Medication  Confirm  05/29/20 08:55  





  Phenylephrine 1 Mg/10 Ml-Ns  Administered  05/29/20 08:56  





  Dose   





  1 mg   





  IV   





  .STK-MED ONE   





     





     





     





     


 


Non-Formulary Medication  65 tab  05/30/20 09:00  





  Ferrous Sulfate  PO   





  DAILY EZEKIEL   





     





     





     





     


 


Pantoprazole Sodium  40 mg  05/27/20 18:30  05/29/20 06:29





  Protonix Iv***  IVPUSH   40 mg





  Q12H EZEKIEL   Administration





     





     





     





     


 


Polyethylene Glycol/Electrolytes  4,000 ml  05/28/20 07:05  05/28/20 07:50





  Golytely  PO  05/28/20 07:06  4,000 ml





  ONETIME ONE   Administration





     





     





     





     


 


Propofol  Confirm  05/29/20 07:11  





  Diprivan  20 Ml  Administered  05/29/20 07:12  





  Dose   





  400 mg   





  .ROUTE   





  .[facility] ONE   





     





     





     





     














- Radiology Interpretation


Free Text/Narrative:: 


89-year-old female presents to the ED per private vehicle from the [facility] in 

[place].  Her chief complaint is black tarry stools per rectum for the last 3 to 

4 days.  She states perhaps up to 5 times daily.  The last few days she has 

appreciated increased shortness of breath on exertion as she usually goes for 

walk around the building twice per day but could not do it yesterday and did 

not try today.  She is also getting lower extremity weakness.  Mild 

lightheadedness with standing.  She denies any upper GI complaints.  She has 

had a colonoscopy in the past but she cannot remember quite how long ago.  Of 

note the patient is on Coumadin because of chronic atrial fibrillation and rate 

appears to be well controlled at this time.  Last INR May 14 was recorded at 

2.3.  She was seen in the [place] [facility] this morning and occult blood or guaiac 

testing was positive.  She had labs ordered but they are not yet done as they 

likely have not arrived by .  On examination the patient has blepharal 

pallor and palmar pallor of her hands.  She does appear anemic likely with a 

hemoglobin less than 9 clinically.  She appreciates dyspnea on exertion lungs 

were clear to auscultation.  He has mild aortic stenosis and mild aortic 

insufficiency on exam.  Patient has ecchymoses on her forearms and dorsal hands 

due to being on Coumadin.  Patient has accepted blood transfusion if necessary.

  Consent will be signed.  Plan orthostatic blood pressure.  Routine labs to be 

drawn including pro time and she will be typed and screened.  Portable chest x-

ray to be done and ECG.  IV will be normal saline at 100 mils per hour.








- Re-Assessments/Exams


Free Text/Narrative Re-Assessment/Exam: 





05/27/20 16:05 chest x-ray done by portable technique reveals moderate 

cardiomegaly.  Prominence of the thoracic aorta appreciated.  Lungs are 

borderline hyperinflated.  No evidence of increased vascularity.  Pleural 

effusions.  Clips are appreciated at the base of the neck from previous thyroid 

surgery.  Also surgical clips in the right axilla.





05/27/20 16:21 Hematology reveals a hemoglobin of 9.1.  White cell count was 

8.97 with 68% neutrophils.  Hematocrit is 28.8.  MCV is slightly elevated at 

95.  Platelet count 264,000





05/27/20 16:55 PT is 23.5 with an INR therapeutic at 2.26.  PTT is elevated at 

32.  Urinalysis shows trace intact blood.  1+ leukocyte Estrace the micro is 

pending.  Chemistry  is pending.





05/27/20 17:14 Sodium is 136 with a potassium of 4.1.  Chloride is 100 with a 

bicarb of 30.  I.e. mild CO2 retention.  Anion gap is 10.1.  BUN is elevated at 

34 with a creatinine of 1.4.  GFR is 35 indicating stage III chronic kidney 

disease.  Glucose is 91.  Calcium is 10.1.  Magnesium is 2.4.  Liver function 

is normal.  Troponin is less than 0.018.  BNP is elevated at 1957.  Total 

protein is 7.0 with an albumin fraction of 3.6.  The micro is now available on 

the urinalysis and shows 5-10 WBCs per high-power field and greater than 100 

squamous epithelial cells.  This is concerning for possible renal malignancy.  

Few bacteria appreciated.  Discussed  with Dr. Capone on-call hospitalist 

and she will see the patient in the ED.





05/27/20 17:30: Discussed with Dr. Capone and she will admit the patient to 

the hospital








Departure





- Departure


Time of Disposition: 18:30


Disposition: Refer to Observation


Condition: Fair


Clinical Impression: 


Gastrointestinal hemorrhage


Qualifiers:


 GI bleed type/associated pathology: melena Qualified Code(s): K92.1 - Melena








- Discharge Information


*PRESCRIPTION DRUG MONITORING PROGRAM REVIEWED*: Not Applicable


*COPY OF PRESCRIPTION DRUG MONITORING REPORT IN PATIENT [facility]: Not Applicable





Sepsis Event Note





- Focused Exam


Date Exam was Performed: 05/30/20


Time Exam was Performed: 07:01

## 2020-05-28 NOTE — PCM.CONS
H&P History of Present Illness





- General


Date of Service: 05/28/20


Admit Problem/Dx: 


 Admission Diagnosis/Problem





Admission Diagnosis/Problem      Gastrointestinal hemorrhage








Source of Information: Patient, Old Records


History Limitations: Reports: No Limitations





- History of Present Illness


Other HPI/Comments: 





Ms. Mitchell is an 89 yo woman presenting with a few days of melena. She has a 

history of atrial fibrillation for which she takes coumadin, and she has been 

in the desired therapeutic range with her INR. She has never had melena before. 

She presented to the ER with symptoms of anemia including weakness and 

shortness of breath, and was found to have a Hgb 9.1 g/dL, down from her 

baseline of around 12-13. She denies abdominal pain. She has no history of GI 

bleeding, and no history of peptic ulcer disease. Her last colonoscopy was many 

years ago. She denies unintentional weight loss. She does have noted 

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 





- Related Data


Allergies/Adverse Reactions: 


 Allergies











Allergy/AdvReac Type Severity Reaction Status Date / Time


 


No Known Allergies Allergy   Verified 05/27/20 22:00











Home Medications: 


 Home Meds





calcitrioL [Calcitriol] 0.25 mcg PO BID 10/12/18 [History]


Potassium Chloride 10 meq PO DAILY 11/20/18 [History]


Triamcinolone Acetonide [Triamcinolone Acetonide 0.1% Crm] 1 applic TOP 

ASDIRECTED 12/24/18 [History]


Warfarin [Coumadin] 5 mg PO ASDIRECTED 12/24/18 [History]


Metoprolol Tartrate 25 mg PO BID #60 tablet 12/27/18 [Rx]


Calcium Carbonate [Calcium] 600 mg PO DAILY 05/27/20 [History]


Ferrous Sulfate [Slow Fe] 65 tab PO DAILY 05/27/20 [History]


Furosemide [Lasix] 20 mg PO DAILY 05/27/20 [History]


Levothyroxine [Synthroid] 100 mcg PO ACBREAKFAST 05/27/20 [History]


Magnesium Oxide 250 mg PO BID 05/27/20 [History]


Psyllium Husk [Fiber] 0.4 gm PO DAILY 05/27/20 [History]


Simvastatin 10 mg PO DAILY 05/27/20 [History]


Vit C/E/Zn/Coppr/Lutein/Zeaxan [Preservision Areds 2 Softgel] 1 tab PO BID 05/27 /20 [History]


Warfarin Sodium 2.5 mg PO ASDIRECTED 05/27/20 [History]


dilTIAZem HCL [Diltiazem 12Hr ER] 120 mg PO DAILY 05/27/20 [History]











Past Medical History


HEENT History: Reports: Impaired Vision, Macular Degeneration


Other HEENT History: wears glases for reading


Cardiovascular History: Reports: Afib, High Cholesterol, Hypertension, SOB on 

Exertion


Other OB/BYN History: hysterectomy, tubal ligation


Musculoskeletal History: Reports: Arthritis


Endocrine/Metabolic History: Reports: Hypoparathyroidism, Hypothyroidism, 

Multinodular Thyroid, Osteoporosis, Vitamin D Deficiency


Immunologic History: Reports: SLE


Oncologic (Cancer) History: Reports: Breast


Dermatologic History: Reports: Psoriasis





- Infectious Disease History


Infectious Disease History: Reports: Chicken Pox, Influenza, Measles





- Past Surgical History


HEENT Surgical History: Reports: Cataract Surgery, Tonsillectomy


Cardiovascular Surgical History: Reports: None


Female  Surgical History: Reports: Hysterectomy, Salpingo-Oophorectomy, Tubal 

Ligation


Endocrine Surgical History: Reports: None


Musculoskeletal Surgical History: Reports: None


Oncologic Surgical History: Reports: Mastectomy


Other Oncologic Surgeries/Procedures: right side masectomy


Dermatological Surgical History: Reports: None





Social & Family History





- Family History


Family Medical History: Noncontributory





- Tobacco Use


Smoking Status *Q: Former Smoker


Years of Tobacco use: 20


Packs/Tins Daily: 1


Used Tobacco, but Quit: No


Month/Year Tobacco Last Used: 1990


Second Hand Smoke Exposure: No





- Caffeine Use


Caffeine Use: Reports: Coffee, Soda


Other Caffeine Use: a pop once in a while and coffee once in awhile.





- Recreational Drug Use


Recreational Drug Use: No





- Living Situation & Occupation


Living situation: Reports: , Assisted Living ([facility])


Occupation: Retired





H&P Review of Systems





- Review of Systems:


Review Of Systems: See Below


General: Reports: Weakness, Fatigue


Pulmonary: Reports: Shortness of Breath


Cardiovascular: Reports: Lightheadedness


Gastrointestinal: Reports: Black Stool, Melena


Skin: Reports: No Symptoms


Psychiatric: Reports: No Symptoms


Hematologic/Lymphatic: Reports: Anemia





Exam





- Exam


Exam: See Below





- Vital Signs


Vital Signs: 


 Last Vital Signs











Temp  36.4 C   05/28/20 11:20


 


Pulse  92   05/28/20 11:20


 


Resp  20   05/28/20 11:20


 


BP  133/85   05/28/20 11:20


 


Pulse Ox  94 L  05/28/20 11:20








 





Orthostatic Blood Pressure [     121/89


Standing]                        


Orthostatic Blood Pressure [     136/59


Supine]                          








Weight: 70.171 kg





- Exam


General: Alert, Oriented, Cooperative


HEENT: Conjunctiva Clear


Neck: Supple


Lungs: Clear to Auscultation, Normal Respiratory Effort


Cardiovascular: Regular Rate


GI/Abdominal Exam: Soft, Non-Tender, No Mass


Extremities: Normal Inspection


Skin: Warm, Dry


Psychiatric: Normal Mood





- Patient Data


Lab Results Last 24 hrs: 


 Laboratory Results - last 24 hr











  05/27/20 05/27/20 05/27/20 Range/Units





  16:00 16:00 16:00 


 


WBC  8.97    (3.98-10.04)  K/mm3


 


RBC  3.03 L    (3.98-5.22)  M/mm3


 


Hgb  9.1 L D    (11.2-15.7)  gm/dl


 


Hct  28.8 L    (34.1-44.9)  %


 


MCV  95.0 H    (79.4-94.8)  fl


 


MCH  30.0    (25.6-32.2)  pg


 


MCHC  31.6 L    (32.2-35.5)  g/dl


 


RDW Std Deviation  48.1 H    (36.4-46.3)  fL


 


Plt Count  264    (182-369)  K/mm3


 


MPV  9.4    (9.4-12.3)  fl


 


Neut % (Auto)  67.8    (34.0-71.1)  %


 


Lymph % (Auto)  19.5    (19.3-51.7)  %


 


Mono % (Auto)  10.1    (4.7-12.5)  %


 


Eos % (Auto)  2.0    (0.7-5.8)  


 


Baso % (Auto)  0.4    (0.1-1.2)  %


 


Neut # (Auto)  6.07    (1.56-6.13)  K/mm3


 


Lymph # (Auto)  1.75    (1.18-3.74)  K/mm3


 


Mono # (Auto)  0.91 H    (0.24-0.36)  K/mm3


 


Eos # (Auto)  0.18    (0.04-0.36)  K/mm3


 


Baso # (Auto)  0.04    (0.01-0.08)  K/mm3


 


ESR     (0-20)  mm/hr


 


Percent Retic     (0.50-1.70)  %


 


PT    23.5 H  (9.7-12.0)  SECONDS


 


INR    2.26  


 


APTT    32 H D  (22-31)  SECONDS


 


Sodium   136   (136-145)  mEq/L


 


Potassium   4.1   (3.5-5.1)  mEq/L


 


Chloride   100   ()  mEq/L


 


Carbon Dioxide   30   (21-32)  mEq/L


 


Anion Gap   10.1   (5-15)  


 


BUN   34 H   (7-18)  mg/dL


 


Creatinine   1.4 H   (0.55-1.02)  mg/dL


 


Est Cr Clr Drug Dosing   TNP   


 


Estimated GFR (MDRD)   35   (>60)  mL/min


 


BUN/Creatinine Ratio   24.3 H   (14-18)  


 


Glucose   91   ()  mg/dL


 


POC Glucose     ()  mg/dL


 


Calcium   10.1   (8.5-10.1)  mg/dL


 


Phosphorus     (2.6-4.7)  mg/dL


 


Magnesium   2.4   (1.8-2.4)  mg/dl


 


Iron     ()  ug/dL


 


TIBC     (100-400)  ug/dL


 


% Saturation     (20-55)  %


 


Transferrin     (202-364)  mg/dL


 


Total Bilirubin   0.3   (0.2-1.0)  mg/dL


 


AST   30   (15-37)  U/L


 


ALT   35   (14-59)  U/L


 


Alkaline Phosphatase   73   ()  U/L


 


Troponin I   0.018   (0.00-0.056)  ng/mL


 


NT-Pro-B Natriuret Pep     (0-450)  pg/mL


 


Total Protein   7.0   (6.4-8.2)  g/dl


 


Albumin   3.6   (3.4-5.0)  g/dl


 


Globulin   3.4   gm/dL


 


Albumin/Globulin Ratio   1.1   (1-2)  


 


Vitamin B12     (193-986)  pg/ml


 


Vitamin D 25-Hydroxy     (30.0-100.0)  ng/ml


 


Folate     (8.6-58.9)  ng/mL


 


Urine Color     (Yellow)  


 


Urine Appearance     (Clear)  


 


Urine pH     (5.0-8.0)  


 


Ur Specific Gravity     (1.005-1.030)  


 


Urine Protein     (Negative)  


 


Urine Glucose (UA)     (Negative)  


 


Urine Ketones     (Negative)  


 


Urine Occult Blood     (Negative)  


 


Urine Nitrite     (Negative)  


 


Urine Bilirubin     (Negative)  


 


Urine Urobilinogen     (0.2-1.0)  


 


Ur Leukocyte Esterase     (Negative)  


 


Urine RBC     (0-5)  /hpf


 


Urine WBC     (0-5)  /hpf


 


Ur Squamous Epith Cells     (0-5)  /hpf


 


Amorphous Sediment     (NOT SEEN)  /hpf


 


Urine Bacteria     (FEW)  /hpf


 


Urine Mucus     (FEW)  /hpf


 


SARS Virus RNA (PCR)     (NEGATIVE)  


 


MRSA (PCR)     


 


Blood Type     


 


Gel Antibody Screen     














  05/27/20 05/27/20 05/27/20 Range/Units





  16:00 16:00 16:00 


 


WBC     (3.98-10.04)  K/mm3


 


RBC     (3.98-5.22)  M/mm3


 


Hgb     (11.2-15.7)  gm/dl


 


Hct     (34.1-44.9)  %


 


MCV     (79.4-94.8)  fl


 


MCH     (25.6-32.2)  pg


 


MCHC     (32.2-35.5)  g/dl


 


RDW Std Deviation     (36.4-46.3)  fL


 


Plt Count     (182-369)  K/mm3


 


MPV     (9.4-12.3)  fl


 


Neut % (Auto)     (34.0-71.1)  %


 


Lymph % (Auto)     (19.3-51.7)  %


 


Mono % (Auto)     (4.7-12.5)  %


 


Eos % (Auto)     (0.7-5.8)  


 


Baso % (Auto)     (0.1-1.2)  %


 


Neut # (Auto)     (1.56-6.13)  K/mm3


 


Lymph # (Auto)     (1.18-3.74)  K/mm3


 


Mono # (Auto)     (0.24-0.36)  K/mm3


 


Eos # (Auto)     (0.04-0.36)  K/mm3


 


Baso # (Auto)     (0.01-0.08)  K/mm3


 


ESR  49 H    (0-20)  mm/hr


 


Percent Retic     (0.50-1.70)  %


 


PT     (9.7-12.0)  SECONDS


 


INR     


 


APTT     (22-31)  SECONDS


 


Sodium     (136-145)  mEq/L


 


Potassium     (3.5-5.1)  mEq/L


 


Chloride     ()  mEq/L


 


Carbon Dioxide     (21-32)  mEq/L


 


Anion Gap     (5-15)  


 


BUN     (7-18)  mg/dL


 


Creatinine     (0.55-1.02)  mg/dL


 


Est Cr Clr Drug Dosing     


 


Estimated GFR (MDRD)     (>60)  mL/min


 


BUN/Creatinine Ratio     (14-18)  


 


Glucose     ()  mg/dL


 


POC Glucose     ()  mg/dL


 


Calcium     (8.5-10.1)  mg/dL


 


Phosphorus     (2.6-4.7)  mg/dL


 


Magnesium     (1.8-2.4)  mg/dl


 


Iron     ()  ug/dL


 


TIBC     (100-400)  ug/dL


 


% Saturation     (20-55)  %


 


Transferrin     (202-364)  mg/dL


 


Total Bilirubin     (0.2-1.0)  mg/dL


 


AST     (15-37)  U/L


 


ALT     (14-59)  U/L


 


Alkaline Phosphatase     ()  U/L


 


Troponin I     (0.00-0.056)  ng/mL


 


NT-Pro-B Natriuret Pep   1957 H   (0-450)  pg/mL


 


Total Protein     (6.4-8.2)  g/dl


 


Albumin     (3.4-5.0)  g/dl


 


Globulin     gm/dL


 


Albumin/Globulin Ratio     (1-2)  


 


Vitamin B12     (193-986)  pg/ml


 


Vitamin D 25-Hydroxy     (30.0-100.0)  ng/ml


 


Folate     (8.6-58.9)  ng/mL


 


Urine Color     (Yellow)  


 


Urine Appearance     (Clear)  


 


Urine pH     (5.0-8.0)  


 


Ur Specific Gravity     (1.005-1.030)  


 


Urine Protein     (Negative)  


 


Urine Glucose (UA)     (Negative)  


 


Urine Ketones     (Negative)  


 


Urine Occult Blood     (Negative)  


 


Urine Nitrite     (Negative)  


 


Urine Bilirubin     (Negative)  


 


Urine Urobilinogen     (0.2-1.0)  


 


Ur Leukocyte Esterase     (Negative)  


 


Urine RBC     (0-5)  /hpf


 


Urine WBC     (0-5)  /hpf


 


Ur Squamous Epith Cells     (0-5)  /hpf


 


Amorphous Sediment     (NOT SEEN)  /hpf


 


Urine Bacteria     (FEW)  /hpf


 


Urine Mucus     (FEW)  /hpf


 


SARS Virus RNA (PCR)     (NEGATIVE)  


 


MRSA (PCR)     


 


Blood Type    A POSITIVE  


 


Gel Antibody Screen    Negative  














  05/27/20 05/27/20 05/27/20 Range/Units





  16:15 18:45 19:00 


 


WBC     (3.98-10.04)  K/mm3


 


RBC     (3.98-5.22)  M/mm3


 


Hgb    9.8 L  (11.2-15.7)  gm/dl


 


Hct    30.8 L  (34.1-44.9)  %


 


MCV     (79.4-94.8)  fl


 


MCH     (25.6-32.2)  pg


 


MCHC     (32.2-35.5)  g/dl


 


RDW Std Deviation     (36.4-46.3)  fL


 


Plt Count     (182-369)  K/mm3


 


MPV     (9.4-12.3)  fl


 


Neut % (Auto)     (34.0-71.1)  %


 


Lymph % (Auto)     (19.3-51.7)  %


 


Mono % (Auto)     (4.7-12.5)  %


 


Eos % (Auto)     (0.7-5.8)  


 


Baso % (Auto)     (0.1-1.2)  %


 


Neut # (Auto)     (1.56-6.13)  K/mm3


 


Lymph # (Auto)     (1.18-3.74)  K/mm3


 


Mono # (Auto)     (0.24-0.36)  K/mm3


 


Eos # (Auto)     (0.04-0.36)  K/mm3


 


Baso # (Auto)     (0.01-0.08)  K/mm3


 


ESR     (0-20)  mm/hr


 


Percent Retic     (0.50-1.70)  %


 


PT     (9.7-12.0)  SECONDS


 


INR     


 


APTT     (22-31)  SECONDS


 


Sodium     (136-145)  mEq/L


 


Potassium     (3.5-5.1)  mEq/L


 


Chloride     ()  mEq/L


 


Carbon Dioxide     (21-32)  mEq/L


 


Anion Gap     (5-15)  


 


BUN     (7-18)  mg/dL


 


Creatinine     (0.55-1.02)  mg/dL


 


Est Cr Clr Drug Dosing     


 


Estimated GFR (MDRD)     (>60)  mL/min


 


BUN/Creatinine Ratio     (14-18)  


 


Glucose     ()  mg/dL


 


POC Glucose     ()  mg/dL


 


Calcium     (8.5-10.1)  mg/dL


 


Phosphorus     (2.6-4.7)  mg/dL


 


Magnesium     (1.8-2.4)  mg/dl


 


Iron     ()  ug/dL


 


TIBC     (100-400)  ug/dL


 


% Saturation     (20-55)  %


 


Transferrin     (202-364)  mg/dL


 


Total Bilirubin     (0.2-1.0)  mg/dL


 


AST     (15-37)  U/L


 


ALT     (14-59)  U/L


 


Alkaline Phosphatase     ()  U/L


 


Troponin I     (0.00-0.056)  ng/mL


 


NT-Pro-B Natriuret Pep     (0-450)  pg/mL


 


Total Protein     (6.4-8.2)  g/dl


 


Albumin     (3.4-5.0)  g/dl


 


Globulin     gm/dL


 


Albumin/Globulin Ratio     (1-2)  


 


Vitamin B12     (193-986)  pg/ml


 


Vitamin D 25-Hydroxy     (30.0-100.0)  ng/ml


 


Folate     (8.6-58.9)  ng/mL


 


Urine Color  Light yellow    (Yellow)  


 


Urine Appearance  Cloudy H    (Clear)  


 


Urine pH  7.5    (5.0-8.0)  


 


Ur Specific Gravity  1.020    (1.005-1.030)  


 


Urine Protein  Negative    (Negative)  


 


Urine Glucose (UA)  Negative    (Negative)  


 


Urine Ketones  Negative    (Negative)  


 


Urine Occult Blood  Trace-intact H    (Negative)  


 


Urine Nitrite  Negative    (Negative)  


 


Urine Bilirubin  Negative    (Negative)  


 


Urine Urobilinogen  0.2    (0.2-1.0)  


 


Ur Leukocyte Esterase  1+ H    (Negative)  


 


Urine RBC  0-5    (0-5)  /hpf


 


Urine WBC  5-10 H    (0-5)  /hpf


 


Ur Squamous Epith Cells  >100 H    (0-5)  /hpf


 


Amorphous Sediment  Many H    (NOT SEEN)  /hpf


 


Urine Bacteria  Few    (FEW)  /hpf


 


Urine Mucus  Not seen    (FEW)  /hpf


 


SARS Virus RNA (PCR)     (NEGATIVE)  


 


MRSA (PCR)   Negative   


 


Blood Type     


 


Gel Antibody Screen     














  05/27/20 05/27/20 05/27/20 Range/Units





  19:00 19:20 19:20 


 


WBC     (3.98-10.04)  K/mm3


 


RBC     (3.98-5.22)  M/mm3


 


Hgb     (11.2-15.7)  gm/dl


 


Hct     (34.1-44.9)  %


 


MCV     (79.4-94.8)  fl


 


MCH     (25.6-32.2)  pg


 


MCHC     (32.2-35.5)  g/dl


 


RDW Std Deviation     (36.4-46.3)  fL


 


Plt Count     (182-369)  K/mm3


 


MPV     (9.4-12.3)  fl


 


Neut % (Auto)     (34.0-71.1)  %


 


Lymph % (Auto)     (19.3-51.7)  %


 


Mono % (Auto)     (4.7-12.5)  %


 


Eos % (Auto)     (0.7-5.8)  


 


Baso % (Auto)     (0.1-1.2)  %


 


Neut # (Auto)     (1.56-6.13)  K/mm3


 


Lymph # (Auto)     (1.18-3.74)  K/mm3


 


Mono # (Auto)     (0.24-0.36)  K/mm3


 


Eos # (Auto)     (0.04-0.36)  K/mm3


 


Baso # (Auto)     (0.01-0.08)  K/mm3


 


ESR     (0-20)  mm/hr


 


Percent Retic  2.77 H    (0.50-1.70)  %


 


PT     (9.7-12.0)  SECONDS


 


INR     


 


APTT     (22-31)  SECONDS


 


Sodium     (136-145)  mEq/L


 


Potassium     (3.5-5.1)  mEq/L


 


Chloride     ()  mEq/L


 


Carbon Dioxide     (21-32)  mEq/L


 


Anion Gap     (5-15)  


 


BUN     (7-18)  mg/dL


 


Creatinine     (0.55-1.02)  mg/dL


 


Est Cr Clr Drug Dosing     


 


Estimated GFR (MDRD)     (>60)  mL/min


 


BUN/Creatinine Ratio     (14-18)  


 


Glucose     ()  mg/dL


 


POC Glucose     ()  mg/dL


 


Calcium     (8.5-10.1)  mg/dL


 


Phosphorus     (2.6-4.7)  mg/dL


 


Magnesium     (1.8-2.4)  mg/dl


 


Iron   81   ()  ug/dL


 


TIBC   344   (100-400)  ug/dL


 


% Saturation   24   (20-55)  %


 


Transferrin   275   (202-364)  mg/dL


 


Total Bilirubin     (0.2-1.0)  mg/dL


 


AST     (15-37)  U/L


 


ALT     (14-59)  U/L


 


Alkaline Phosphatase     ()  U/L


 


Troponin I     (0.00-0.056)  ng/mL


 


NT-Pro-B Natriuret Pep     (0-450)  pg/mL


 


Total Protein     (6.4-8.2)  g/dl


 


Albumin     (3.4-5.0)  g/dl


 


Globulin     gm/dL


 


Albumin/Globulin Ratio     (1-2)  


 


Vitamin B12   255   (193-986)  pg/ml


 


Vitamin D 25-Hydroxy    51.9  (30.0-100.0)  ng/ml


 


Folate   14.9   (8.6-58.9)  ng/mL


 


Urine Color     (Yellow)  


 


Urine Appearance     (Clear)  


 


Urine pH     (5.0-8.0)  


 


Ur Specific Gravity     (1.005-1.030)  


 


Urine Protein     (Negative)  


 


Urine Glucose (UA)     (Negative)  


 


Urine Ketones     (Negative)  


 


Urine Occult Blood     (Negative)  


 


Urine Nitrite     (Negative)  


 


Urine Bilirubin     (Negative)  


 


Urine Urobilinogen     (0.2-1.0)  


 


Ur Leukocyte Esterase     (Negative)  


 


Urine RBC     (0-5)  /hpf


 


Urine WBC     (0-5)  /hpf


 


Ur Squamous Epith Cells     (0-5)  /hpf


 


Amorphous Sediment     (NOT SEEN)  /hpf


 


Urine Bacteria     (FEW)  /hpf


 


Urine Mucus     (FEW)  /hpf


 


SARS Virus RNA (PCR)     (NEGATIVE)  


 


MRSA (PCR)     


 


Blood Type     


 


Gel Antibody Screen     














  05/27/20 05/28/20 05/28/20 Range/Units





  23:10 03:00 03:00 


 


WBC     (3.98-10.04)  K/mm3


 


RBC     (3.98-5.22)  M/mm3


 


Hgb  8.4 L  8.4 L   (11.2-15.7)  gm/dl


 


Hct  26.2 L  26.5 L   (34.1-44.9)  %


 


MCV     (79.4-94.8)  fl


 


MCH     (25.6-32.2)  pg


 


MCHC     (32.2-35.5)  g/dl


 


RDW Std Deviation     (36.4-46.3)  fL


 


Plt Count     (182-369)  K/mm3


 


MPV     (9.4-12.3)  fl


 


Neut % (Auto)     (34.0-71.1)  %


 


Lymph % (Auto)     (19.3-51.7)  %


 


Mono % (Auto)     (4.7-12.5)  %


 


Eos % (Auto)     (0.7-5.8)  


 


Baso % (Auto)     (0.1-1.2)  %


 


Neut # (Auto)     (1.56-6.13)  K/mm3


 


Lymph # (Auto)     (1.18-3.74)  K/mm3


 


Mono # (Auto)     (0.24-0.36)  K/mm3


 


Eos # (Auto)     (0.04-0.36)  K/mm3


 


Baso # (Auto)     (0.01-0.08)  K/mm3


 


ESR     (0-20)  mm/hr


 


Percent Retic     (0.50-1.70)  %


 


PT     (9.7-12.0)  SECONDS


 


INR     


 


APTT     (22-31)  SECONDS


 


Sodium    138  (136-145)  mEq/L


 


Potassium    3.8  (3.5-5.1)  mEq/L


 


Chloride    104  ()  mEq/L


 


Carbon Dioxide    29  (21-32)  mEq/L


 


Anion Gap    8.8  (5-15)  


 


BUN    30 H  (7-18)  mg/dL


 


Creatinine    1.3 H  (0.55-1.02)  mg/dL


 


Est Cr Clr Drug Dosing    27.46  


 


Estimated GFR (MDRD)    39  (>60)  mL/min


 


BUN/Creatinine Ratio    23.1 H  (14-18)  


 


Glucose    88  ()  mg/dL


 


POC Glucose     ()  mg/dL


 


Calcium    8.7  (8.5-10.1)  mg/dL


 


Phosphorus    3.5  (2.6-4.7)  mg/dL


 


Magnesium    1.9  (1.8-2.4)  mg/dl


 


Iron     ()  ug/dL


 


TIBC     (100-400)  ug/dL


 


% Saturation     (20-55)  %


 


Transferrin     (202-364)  mg/dL


 


Total Bilirubin     (0.2-1.0)  mg/dL


 


AST     (15-37)  U/L


 


ALT     (14-59)  U/L


 


Alkaline Phosphatase     ()  U/L


 


Troponin I     (0.00-0.056)  ng/mL


 


NT-Pro-B Natriuret Pep     (0-450)  pg/mL


 


Total Protein     (6.4-8.2)  g/dl


 


Albumin     (3.4-5.0)  g/dl


 


Globulin     gm/dL


 


Albumin/Globulin Ratio     (1-2)  


 


Vitamin B12     (193-986)  pg/ml


 


Vitamin D 25-Hydroxy     (30.0-100.0)  ng/ml


 


Folate     (8.6-58.9)  ng/mL


 


Urine Color     (Yellow)  


 


Urine Appearance     (Clear)  


 


Urine pH     (5.0-8.0)  


 


Ur Specific Gravity     (1.005-1.030)  


 


Urine Protein     (Negative)  


 


Urine Glucose (UA)     (Negative)  


 


Urine Ketones     (Negative)  


 


Urine Occult Blood     (Negative)  


 


Urine Nitrite     (Negative)  


 


Urine Bilirubin     (Negative)  


 


Urine Urobilinogen     (0.2-1.0)  


 


Ur Leukocyte Esterase     (Negative)  


 


Urine RBC     (0-5)  /hpf


 


Urine WBC     (0-5)  /hpf


 


Ur Squamous Epith Cells     (0-5)  /hpf


 


Amorphous Sediment     (NOT SEEN)  /hpf


 


Urine Bacteria     (FEW)  /hpf


 


Urine Mucus     (FEW)  /hpf


 


SARS Virus RNA (PCR)     (NEGATIVE)  


 


MRSA (PCR)     


 


Blood Type     


 


Gel Antibody Screen     














  05/28/20 05/28/20 05/28/20 Range/Units





  07:14 08:15 08:42 


 


WBC     (3.98-10.04)  K/mm3


 


RBC     (3.98-5.22)  M/mm3


 


Hgb  8.6 L    (11.2-15.7)  gm/dl


 


Hct  26.7 L    (34.1-44.9)  %


 


MCV     (79.4-94.8)  fl


 


MCH     (25.6-32.2)  pg


 


MCHC     (32.2-35.5)  g/dl


 


RDW Std Deviation     (36.4-46.3)  fL


 


Plt Count     (182-369)  K/mm3


 


MPV     (9.4-12.3)  fl


 


Neut % (Auto)     (34.0-71.1)  %


 


Lymph % (Auto)     (19.3-51.7)  %


 


Mono % (Auto)     (4.7-12.5)  %


 


Eos % (Auto)     (0.7-5.8)  


 


Baso % (Auto)     (0.1-1.2)  %


 


Neut # (Auto)     (1.56-6.13)  K/mm3


 


Lymph # (Auto)     (1.18-3.74)  K/mm3


 


Mono # (Auto)     (0.24-0.36)  K/mm3


 


Eos # (Auto)     (0.04-0.36)  K/mm3


 


Baso # (Auto)     (0.01-0.08)  K/mm3


 


ESR     (0-20)  mm/hr


 


Percent Retic     (0.50-1.70)  %


 


PT     (9.7-12.0)  SECONDS


 


INR     


 


APTT     (22-31)  SECONDS


 


Sodium     (136-145)  mEq/L


 


Potassium     (3.5-5.1)  mEq/L


 


Chloride     ()  mEq/L


 


Carbon Dioxide     (21-32)  mEq/L


 


Anion Gap     (5-15)  


 


BUN     (7-18)  mg/dL


 


Creatinine     (0.55-1.02)  mg/dL


 


Est Cr Clr Drug Dosing     


 


Estimated GFR (MDRD)     (>60)  mL/min


 


BUN/Creatinine Ratio     (14-18)  


 


Glucose     ()  mg/dL


 


POC Glucose    89  ()  mg/dL


 


Calcium     (8.5-10.1)  mg/dL


 


Phosphorus     (2.6-4.7)  mg/dL


 


Magnesium     (1.8-2.4)  mg/dl


 


Iron     ()  ug/dL


 


TIBC     (100-400)  ug/dL


 


% Saturation     (20-55)  %


 


Transferrin     (202-364)  mg/dL


 


Total Bilirubin     (0.2-1.0)  mg/dL


 


AST     (15-37)  U/L


 


ALT     (14-59)  U/L


 


Alkaline Phosphatase     ()  U/L


 


Troponin I     (0.00-0.056)  ng/mL


 


NT-Pro-B Natriuret Pep     (0-450)  pg/mL


 


Total Protein     (6.4-8.2)  g/dl


 


Albumin     (3.4-5.0)  g/dl


 


Globulin     gm/dL


 


Albumin/Globulin Ratio     (1-2)  


 


Vitamin B12     (193-986)  pg/ml


 


Vitamin D 25-Hydroxy     (30.0-100.0)  ng/ml


 


Folate     (8.6-58.9)  ng/mL


 


Urine Color     (Yellow)  


 


Urine Appearance     (Clear)  


 


Urine pH     (5.0-8.0)  


 


Ur Specific Gravity     (1.005-1.030)  


 


Urine Protein     (Negative)  


 


Urine Glucose (UA)     (Negative)  


 


Urine Ketones     (Negative)  


 


Urine Occult Blood     (Negative)  


 


Urine Nitrite     (Negative)  


 


Urine Bilirubin     (Negative)  


 


Urine Urobilinogen     (0.2-1.0)  


 


Ur Leukocyte Esterase     (Negative)  


 


Urine RBC     (0-5)  /hpf


 


Urine WBC     (0-5)  /hpf


 


Ur Squamous Epith Cells     (0-5)  /hpf


 


Amorphous Sediment     (NOT SEEN)  /hpf


 


Urine Bacteria     (FEW)  /hpf


 


Urine Mucus     (FEW)  /hpf


 


SARS Virus RNA (PCR)   Negative   (NEGATIVE)  


 


MRSA (PCR)     


 


Blood Type     


 


Gel Antibody Screen     











Result Diagrams: 


 05/28/20 07:14





 05/28/20 03:00





Sepsis Event Note





- Evaluation


Sepsis Screening Result: No Definite Risk





- Focused Exam


Vital Signs: 


 Vital Signs











  Temp Pulse Resp BP Pulse Ox


 


 05/28/20 11:20  36.4 C  92  20  133/85  94 L


 


 05/28/20 08:58  36.5 C  78  16  135/94 H  95


 


 05/28/20 05:12  36.5 C  81  18  140/75  92 L











Date Exam was Performed: 05/28/20


Time Exam was Performed: 14:59





*Q Meaningful Use (ADM)





- VTE *Q


VTE Anticoagulation Contraindications: Med/TX Not Indicated/Need





- VTE Risk Assess *Q


Each Risk Factor Represents 3 Points: Age 75 Years or Greater


Total Score 3 Point Risk Factors: 3





Consult PN Assessment/Plan


Procedures: 


Procedures





AGENT NOS ASSAY W/OPTIC (12/24/18)


AIRWAY INHALATION TREATMENT (12/24/18)


ASSAY CARBOXYHB QUANT (11/20/18)


ASSAY OF BLOOD OSMOLALITY (04/23/15)


ASSAY OF BLOOD/URIC ACID (07/17/19)


ASSAY OF CALCIUM (04/25/18)


ASSAY OF FREE THYROXINE (05/04/20)


ASSAY OF LACTIC ACID (03/31/18)


ASSAY OF LIPASE (03/31/18)


ASSAY OF MAGNESIUM (05/04/20)


ASSAY OF NATRIURETIC PEPTIDE (09/09/19)


ASSAY OF PARATHORMONE (11/27/18)


ASSAY OF PHOSPHORUS (05/04/20)


ASSAY OF TROPONIN QUANT (12/24/18)


ASSAY THYROID STIM HORMONE (05/04/20)


BL SMEAR W/DIFF WBC COUNT (12/24/18)


BLOOD CULTURE FOR BACTERIA (12/24/18)


BLOOD GASES ANY COMBINATION (12/24/18)


C DIFF AMPLIFIED PROBE (12/24/18)


C-REACTIVE PROTEIN (12/24/18)


CHEST X-RAY 1 VIEW FRONTAL (04/23/15)


CHEST X-RAY 2VW FRONTAL&LATL (03/30/17)


CHYLMD PNEUM DNA AMP PROBE (12/24/18)


COMPLETE CBC AUTOMATED (10/08/19)


COMPLETE CBC W/AUTO DIFF WBC (01/20/20)


COMPREHEN METABOLIC PANEL (05/04/20)


CREATINE MB FRACTION (04/27/15)


CRYPTOSPORIDIUM AG IA (05/01/15)


CT ABD & PELV W/CONTRAST (03/31/18)


CT HEAD/BRAIN W/O DYE (11/20/18)


CT THORAX W/DYE (04/03/17)


CULTURE OTHR SPECIMN AEROBIC (12/24/18)


DETECT AGENT NOS DNA AMP (12/24/18)


ECG MONIT/REPRT UP TO 48 HRS (04/13/17)


ECG MONIT/REPRT UP TO 48 HRS (04/13/17)


ECG MONIT/REPRT UP TO 48 HRS (04/23/15)


ECG MONIT/REPRT UP TO 48 HRS (04/23/15)


ECHO EXAM OF ABDOMEN (04/18/17)


ELECTROCARDIOGRAM TRACING (12/24/18)


EMERGENCY DEPT VISIT (12/24/18)


EMERGENCY DEPT VISIT (10/12/18)


EMERGENCY DEPT VISIT (08/16/18)


EMERGENCY DEPT VISIT (04/13/17)


EMERGENCY DEPT VISIT (04/23/15)


EXTRACRANIAL BILAT STUDY (04/23/15)


FREE ASSAY (FT-3) (05/04/20)


GAIT TRAINING THERAPY (12/24/18)


GIARDIA AG IA (05/01/15)


HYDRATE IV INFUSION ADD-ON (12/24/18)


HYDRATION IV INFUSION INIT (12/24/18)


INFLUENZA ASSAY W/OPTIC (12/24/18)


LACTATE (LD) (LDH) ENZYME (04/27/15)


LEUKOCYTE ASSESSMENT FECAL (03/31/18)


LIPID PANEL (11/27/18)


M.PNEUMON DNA AMP PROBE (12/24/18)


MEASURE BLOOD OXYGEN LEVEL (12/24/18)


MEASURE BLOOD OXYGEN LEVEL (04/23/15)


METABOLIC PANEL TOTAL CA (10/21/19)


MICROBE SUSCEPTIBLE LIZ (10/12/18)


MR-STAPH DNA AMP PROBE (12/24/18)


MRI BRAIN STEM W/O & W/DYE (04/23/15)


MRI BRAIN STEM W/O DYE (04/03/17)


MYCOPLASMA ANTIBODY (12/24/18)


OT EVAL LOW COMPLEX 30 MIN (12/24/18)


OT EVALUATION (04/23/15)


PROTHROMBIN TIME (05/14/20)


PT EVAL MOD COMPLEX 30 MIN (12/24/18)


PT EVALUATION (04/23/15)


RESP VIRUS 6-11 TARGETS (12/24/18)


ROTAVIRUS AG IA (03/31/18)


ROUTINE VENIPUNCTURE (12/24/18)


SMEAR GRAM STAIN (12/24/18)


STOOL CULTR AEROBIC BACT EA (05/01/15)


THER/PROPH/DIAG INJ IV PUSH (11/20/18)


THER/PROPH/DIAG INJ SC/IM (10/12/18)


THERAPEUTIC ACTIVITIES (12/24/18)


THERAPEUTIC EXERCISES (12/24/18)


THROMBOPLASTIN TIME PARTIAL (11/20/18)


TOTAL CORTISOL (01/08/19)


TTE W/DOPPLER COMPLETE (04/23/15)


TX/PRO/DX INJ NEW DRUG ADDON (11/20/18)


TX/PRO/DX INJ SAME DRUG ADON (03/31/18)


URINALYSIS AUTO W/O SCOPE (09/20/17)


URINALYSIS AUTO W/SCOPE (12/24/18)


URINE BACTERIA CULTURE (10/12/18)


URINE CULTURE/COLONY COUNT (12/24/18)


US COMPL JOINT R-T W/IMG (07/26/19)


US EXAM ABDOM COMPLETE (10/12/18)


VANOMYCIN DNA AMP PROBE (05/01/15)


VITAMIN D 25 HYDROXY (05/04/20)


WITHDRAWAL OF ARTERIAL BLOOD (12/24/18)


X-RAY EXAM CHEST 2 VIEWS (09/09/19)


X-RAY EXAM OF ABDOMEN (04/23/15)


X-RAY EXAM OF FOOT (07/17/19)








Problem List Initiated/Reviewed/Updated: Yes


Plan: 





GI bleed- stable since admission with no transfusion requirement. Patient is 

completing GoLytely bowel prep today and will plan for Diagnostic upper and 

lower endoscopy tomorrow morning. Plan for clear liquid diet only today, NPO 

after midnight.

## 2020-05-28 NOTE — PCM.PREANE
<Nisa Bahena - Last Filed: 05/28/20 16:40>





Preanesthetic Assessment





- Procedure


Proposed Procedure: 





Diagnostic EGD and Colonoscopy





- Anesthesia/Transfusion/Family Hx


Anesthesia History: Prior Anesthesia Without Reaction


Family History of Anesthesia Reaction: No


Transfusion History: Prior Transfusion Without Reaction





- Review of Systems


General: Fatigue, Appetite (Decreased)


Pulmonary: No Symptoms


Cardiovascular: Dyspnea on Exertion (Was walking laps around the [place] 

before she started having black stools. Now she is able to go to the bathroom 

but gets winded with long distances. ), Other (CHF, on lasix, BNP elevated. )


Gastrointestinal: Diarrhea


Neurological: No Symptoms


Other: Reports: Easy Bleeding, Easy Bruising (Anticoagulated on Coumadin)





- Physical Assessment


Vital Signs: 





 Last Vital Signs











Temp  98.1 F   05/29/20 07:40


 


Pulse  63   05/29/20 07:40


 


Resp  16   05/29/20 07:40


 


BP  132/92 H  05/29/20 07:40


 


Pulse Ox  97   05/29/20 07:40








 





Orthostatic Blood Pressure [     121/89


Standing]                        


Orthostatic Blood Pressure [     136/59


Supine]                          








Height: 5 ft 6 in


Weight: 70.171 kg


ASA Class: 3


Mental Status: Alert & Oriented x3


Airway Class: Mallampati = 2


Dentition: Reports: Missing Tooth/Teeth, Caries


Thyro-Mental Finger Breadths: 1


Mouth Opening Finger Breadths: 3


ROM/Head Extension: Full


Lungs: Clear to Auscultation, Normal Respiratory Effort


Cardiovascular: Irregular Rhythm (Atrial Fibrilation documented on EKG rate of 

112bpm now 92 bpm)





- Lab


Values: 





 Laboratory Last Values











WBC  8.97 K/mm3 (3.98-10.04)   05/27/20  16:00    


 


RBC  3.03 M/mm3 (3.98-5.22)  L  05/27/20  16:00    


 


Hgb  8.3 gm/dl (11.2-15.7)  L  05/29/20  07:00    


 


Hct  26.0 % (34.1-44.9)  L  05/29/20  07:00    


 


MCV  95.0 fl (79.4-94.8)  H  05/27/20  16:00    


 


MCH  30.0 pg (25.6-32.2)   05/27/20  16:00    


 


MCHC  31.6 g/dl (32.2-35.5)  L  05/27/20  16:00    


 


RDW Std Deviation  48.1 fL (36.4-46.3)  H  05/27/20  16:00    


 


Plt Count  264 K/mm3 (182-369)   05/27/20  16:00    


 


MPV  9.4 fl (9.4-12.3)   05/27/20  16:00    


 


Neut % (Auto)  67.8 % (34.0-71.1)   05/27/20  16:00    


 


Lymph % (Auto)  19.5 % (19.3-51.7)   05/27/20  16:00    


 


Mono % (Auto)  10.1 % (4.7-12.5)   05/27/20  16:00    


 


Eos % (Auto)  2.0  (0.7-5.8)   05/27/20  16:00    


 


Baso % (Auto)  0.4 % (0.1-1.2)   05/27/20  16:00    


 


Neut # (Auto)  6.07 K/mm3 (1.56-6.13)   05/27/20  16:00    


 


Lymph # (Auto)  1.75 K/mm3 (1.18-3.74)   05/27/20  16:00    


 


Mono # (Auto)  0.91 K/mm3 (0.24-0.36)  H  05/27/20  16:00    


 


Eos # (Auto)  0.18 K/mm3 (0.04-0.36)   05/27/20  16:00    


 


Baso # (Auto)  0.04 K/mm3 (0.01-0.08)   05/27/20  16:00    


 


ESR  49 mm/hr (0-20)  H  05/27/20  16:00    


 


Percent Retic  2.77 % (0.50-1.70)  H  05/27/20  19:00    


 


PT  21.9 SECONDS (9.7-12.0)  H  05/29/20  06:15    


 


INR  2.10   05/29/20  06:15    


 


APTT  32 SECONDS (22-31)  H D 05/27/20  16:00    


 


Sodium  138 mEq/L (136-145)   05/28/20  03:00    


 


Potassium  3.8 mEq/L (3.5-5.1)   05/28/20  03:00    


 


Chloride  104 mEq/L ()   05/28/20  03:00    


 


Carbon Dioxide  29 mEq/L (21-32)   05/28/20  03:00    


 


Anion Gap  8.8  (5-15)   05/28/20  03:00    


 


BUN  30 mg/dL (7-18)  H  05/28/20  03:00    


 


Creatinine  1.3 mg/dL (0.55-1.02)  H  05/28/20  03:00    


 


Est Cr Clr Drug Dosing  27.46 mL/min  05/28/20  03:00    


 


Estimated GFR (MDRD)  39 mL/min (>60)   05/28/20  03:00    


 


BUN/Creatinine Ratio  23.1  (14-18)  H  05/28/20  03:00    


 


Glucose  88 mg/dL ()   05/28/20  03:00    


 


POC Glucose  89 mg/dL ()   05/28/20  08:42    


 


Calcium  8.7 mg/dL (8.5-10.1)   05/28/20  03:00    


 


Phosphorus  3.5 mg/dL (2.6-4.7)   05/28/20  03:00    


 


Magnesium  1.9 mg/dl (1.8-2.4)   05/28/20  03:00    


 


Iron  81 ug/dL ()   05/27/20  19:20    


 


TIBC  344 ug/dL (100-400)   05/27/20  19:20    


 


% Saturation  24 % (20-55)   05/27/20  19:20    


 


Transferrin  275 mg/dL (202-364)   05/27/20  19:20    


 


Total Bilirubin  0.3 mg/dL (0.2-1.0)   05/27/20  16:00    


 


AST  30 U/L (15-37)   05/27/20  16:00    


 


ALT  35 U/L (14-59)   05/27/20  16:00    


 


Alkaline Phosphatase  73 U/L ()   05/27/20  16:00    


 


Troponin I  0.018 ng/mL (0.00-0.056)   05/27/20  16:00    


 


NT-Pro-B Natriuret Pep  1957 pg/mL (0-450)  H  05/27/20  16:00    


 


Total Protein  7.0 g/dl (6.4-8.2)   05/27/20  16:00    


 


Albumin  3.6 g/dl (3.4-5.0)   05/27/20  16:00    


 


Globulin  3.4 gm/dL  05/27/20  16:00    


 


Albumin/Globulin Ratio  1.1  (1-2)   05/27/20  16:00    


 


Vitamin B12  255 pg/ml (193-986)   05/27/20  19:20    


 


Vitamin D 25-Hydroxy  51.9 ng/ml (30.0-100.0)   05/27/20  19:20    


 


Folate  14.9 ng/mL (8.6-58.9)   05/27/20  19:20    


 


Urine Color  Light yellow  (Yellow)   05/27/20  16:15    


 


Urine Appearance  Cloudy  (Clear)  H  05/27/20  16:15    


 


Urine pH  7.5  (5.0-8.0)   05/27/20  16:15    


 


Ur Specific Gravity  1.020  (1.005-1.030)   05/27/20  16:15    


 


Urine Protein  Negative  (Negative)   05/27/20  16:15    


 


Urine Glucose (UA)  Negative  (Negative)   05/27/20  16:15    


 


Urine Ketones  Negative  (Negative)   05/27/20  16:15    


 


Urine Occult Blood  Trace-intact  (Negative)  H  05/27/20  16:15    


 


Urine Nitrite  Negative  (Negative)   05/27/20  16:15    


 


Urine Bilirubin  Negative  (Negative)   05/27/20  16:15    


 


Urine Urobilinogen  0.2  (0.2-1.0)   05/27/20  16:15    


 


Ur Leukocyte Esterase  1+  (Negative)  H  05/27/20  16:15    


 


Urine RBC  0-5 /hpf (0-5)   05/27/20  16:15    


 


Urine WBC  5-10 /hpf (0-5)  H  05/27/20  16:15    


 


Ur Squamous Epith Cells  >100 /hpf (0-5)  H  05/27/20  16:15    


 


Amorphous Sediment  Many /hpf (NOT SEEN)  H  05/27/20  16:15    


 


Urine Bacteria  Few /hpf (FEW)   05/27/20  16:15    


 


Urine Mucus  Not seen /hpf (FEW)   05/27/20  16:15    


 


SARS Virus RNA (PCR)  Negative  (NEGATIVE)   05/28/20  08:15    


 


MRSA (PCR)  Negative   05/27/20  18:45    


 


Blood Type  A POSITIVE   05/27/20  16:00    


 


Gel Antibody Screen  Negative   05/27/20  16:00    














- Allergies


Allergies/Adverse Reactions: 


 Allergies











Allergy/AdvReac Type Severity Reaction Status Date / Time


 


No Known Allergies Allergy   Verified 05/27/20 22:00














- Anesthesia Plan


Beta Blocker: Metoprolol





- Acknowledgements


Anesthesia Type Planned: MAC


Pt an Appropriate Candidate for the Planned Anesthesia: Yes


Alternatives and Risks of Anesthesia Discussed w Pt/Guardian: Yes


Pt/Guardian Understands and Agrees with Anesthesia Plan: Yes





PreAnesthesia Questionnaire


HEENT History: Reports: Impaired Vision, Macular Degeneration


Other HEENT History: wears glases for reading


Cardiovascular History: Reports: Afib, High Cholesterol, Hypertension, SOB on 

Exertion


Genitourinary History: Reports: Chronic Renal Insuffiency (Stage III)


Other OB/BYN History: hysterectomy, tubal ligation


Musculoskeletal History: Reports: Arthritis


Endocrine/Metabolic History: Reports: Hypoparathyroidism, Hypothyroidism, 

Multinodular Thyroid, Osteoporosis, Vitamin D Deficiency


Immunologic History: Reports: SLE


Oncologic (Cancer) History: Reports: Breast


Dermatologic History: Reports: Psoriasis





- Infectious Disease History


Infectious Disease History: Reports: Chicken Pox, Influenza, Measles





- Past Surgical History


HEENT Surgical History: Reports: Cataract Surgery, Tonsillectomy


Cardiovascular Surgical History: Reports: None


Female  Surgical History: Reports: Hysterectomy, Salpingo-Oophorectomy, Tubal 

Ligation


Endocrine Surgical History: Reports: None


Musculoskeletal Surgical History: Reports: None


Oncologic Surgical History: Reports: Mastectomy


Other Oncologic Surgeries/Procedures: right side masectomy


Dermatological Surgical History: Reports: None





- SUBSTANCE USE


Smoking Status *Q: Former Smoker


Tobacco Use Within Last Twelve Months: Cigarettes


Second Hand Smoke Exposure: No


Recreational Drug Use History: No





- HOME MEDS


Home Medications: 


 Home Meds





calcitrioL [Calcitriol] 0.25 mcg PO BID 10/12/18 [History]


Potassium Chloride 10 meq PO DAILY 11/20/18 [History]


Triamcinolone Acetonide [Triamcinolone Acetonide 0.1% Crm] 1 applic TOP 

ASDIRECTED 12/24/18 [History]


Warfarin [Coumadin] 5 mg PO ASDIRECTED 12/24/18 [History]


Metoprolol Tartrate 25 mg PO BID #60 tablet 12/27/18 [Rx]


Calcium Carbonate [Calcium] 600 mg PO DAILY 05/27/20 [History]


Ferrous Sulfate [Slow Fe] 65 tab PO DAILY 05/27/20 [History]


Furosemide [Lasix] 20 mg PO DAILY 05/27/20 [History]


Levothyroxine [Synthroid] 100 mcg PO ACBREAKFAST 05/27/20 [History]


Magnesium Oxide 250 mg PO BID 05/27/20 [History]


Psyllium Husk [Fiber] 0.4 gm PO DAILY 05/27/20 [History]


Simvastatin 10 mg PO DAILY 05/27/20 [History]


Vit C/E/Zn/Coppr/Lutein/Zeaxan [Preservision Areds 2 Softgel] 1 tab PO BID 05/27 /20 [History]


Warfarin Sodium 2.5 mg PO ASDIRECTED 05/27/20 [History]


dilTIAZem HCL [Diltiazem 12Hr ER] 120 mg PO DAILY 05/27/20 [History]











- CURRENT (IN HOUSE) MEDS


Current Meds: 





 Current Medications





Lactated Ringer's (Ringers, Lactated)  1,000 mls @ 75 mls/hr IV ASDIRECTED Count includes the [facility]


   Last Admin: 05/29/20 04:41 Dose:  75 mls/hr


Melatonin (Melatonin)  9 mg PO BEDTIME Count includes the [facility]


   Last Admin: 05/28/20 20:57 Dose:  9 mg


Ondansetron HCl (Zofran Odt)  4 mg PO Q6H PRN


   PRN Reason: nausea, able to take PO


Ondansetron HCl (Zofran)  4 mg IV Q6H PRN


   PRN Reason: Nausea/Vomiting


Ondansetron HCl (Zofran)  4 mg IVPUSH ONETIME PRN


   PRN Reason: Nausea/Vomiting


Pantoprazole Sodium (Protonix Iv***)  40 mg IVPUSH Q12H Count includes the [facility]


   Last Admin: 05/29/20 06:29 Dose:  40 mg





Discontinued Medications





Ephedrine Sulfate (Ephedrine 25 Mg/5 Ml Syringe) Confirm Administered Dose 25 

mg IV .[facility]


   Stop: 05/29/20 09:09


Sodium Chloride (Normal Saline)  1,000 mls @ 100 mls/hr IV ASDIRECTED Count includes the [facility]


   Last Admin: 05/27/20 16:15 Dose:  100 mls/hr


Lidocaine HCl (Xylocaine-Mpf 1%) Confirm Administered Dose 4 mls @ as directed 

.ROUTE .[facility]


   Stop: 05/29/20 07:12


Lactated Ringer's (Ringers, Lactated) Confirm Administered Dose 1,000 mls @ as 

directed .ROUTE .[facility]-MED ONE


   Stop: 05/29/20 08:38


Ketamine HCl (Ketalar) Confirm Administered Dose 500 mg .ROUTE .[facility]-MED ONE


   Stop: 05/29/20 08:25


Miscellaneous Medication (Phenylephrine 1 Mg/10 Ml-Ns) Confirm Administered 

Dose 1 mg IV .[facility]-MED ONE


   Stop: 05/29/20 08:56


Polyethylene Glycol/Electrolytes (Golytely)  4,000 ml PO ONETIME ONE


   Stop: 05/28/20 07:06


   Last Admin: 05/28/20 07:50 Dose:  4,000 ml


Propofol (Diprivan  20 Ml) Confirm Administered Dose 400 mg .ROUTE .[facility]-MED ONE


   Stop: 05/29/20 07:12











<Daniella Albarado - Last Filed: 05/29/20 09:59>





Preanesthetic Assessment





- Physical Assessment


NPO Status Date: 05/28/20


NPO Status Time: 23:50





- Blood


Blood Available: No





- Anesthesia Plan


Beta Blocker: Metoprolol (NOT ORDERED IN HOSPITAL- DR TOLD TO HOLD)

## 2020-05-28 NOTE — PCM.PN
- General Info


Date of Service: 05/28/20


Subjective Update: 





Feeling OK


Slept well 


Ambulating 


No complaints





- Patient Data


Vitals - Most Recent: 


 Last Vital Signs











Temp  97.7 F   05/28/20 05:12


 


Pulse  81   05/28/20 05:12


 


Resp  18   05/28/20 05:12


 


BP  140/75   05/28/20 05:12


 


Pulse Ox  92 L  05/28/20 05:12








 


Weight - Most Recent: 70.171 kg





- Exam


General: Alert, Oriented, Cooperative, No Acute Distress


HEENT: Pupils Equal, Pupils Reactive, EOMI, Mucous Membr. Moist/Pink


Neck: Supple, Trachea Midline, No JVD, No Thyromegaly


Lungs: Clear to Auscultation, Normal Respiratory Effort.  No: Crackles, Rales, 

Rhonchi, Rub, Stridor, Wheezing


Cardiovascular: Regular Rate, Regular Rhythm.  No: Murmurs, Gallops, Rubs


GI/Abdominal Exam: Normal Bowel Sounds, Soft, Non-Tender.  No: Distended, 

Guarding, Rigid


Extremities: Normal Inspection


Peripheral Pulses: 2+: Radial (L), Radial (R), Dorsalis Pedis (L), Dorsalis 

Pedis (R)


Neurological: No New Focal Deficit


Psy/Mental Status: Alert, Normal Affect, Normal Mood





Sepsis Event Note





- Evaluation


Sepsis Screening Result: No Definite Risk





- Focused Exam


Vital Signs: 


 Vital Signs











  Temp Pulse Resp BP Pulse Ox


 


 05/28/20 05:12  97.7 F  81  18  140/75  92 L











Date Exam was Performed: 05/29/20


Time Exam was Performed: 11:35





- Problem List & Annotations


(1) Upper GI bleed


SNOMED Code(s): 67976942


   Code(s): K92.2 - GASTROINTESTINAL HEMORRHAGE, UNSPECIFIED   Status: Acute   

Priority: High   Current Visit: Yes   





(2) Microcytic hypochromic anemia


SNOMED Code(s): 35046033


   Code(s): D50.9 - IRON DEFICIENCY ANEMIA, UNSPECIFIED   Status: Acute   

Priority: High   Current Visit: Yes   





(3) Dyspnea on exertion


SNOMED Code(s): 47577493


   Code(s): R06.00 - DYSPNEA, UNSPECIFIED   Status: Acute   Priority: High   

Current Visit: Yes   





(4) Atrial fibrillation


SNOMED Code(s): 60530812


   Code(s): I48.91 - UNSPECIFIED ATRIAL FIBRILLATION   Status: Chronic   

Priority: High   Current Visit: Yes   


Qualifiers: 


   Atrial fibrillation type: unspecified   Qualified Code(s): I48.91 - 

Unspecified atrial fibrillation   





(5) Warfarin anticoagulation


SNOMED Code(s): 40779202, 529541653, 172961541


   Code(s): Z79.01 - LONG TERM (CURRENT) USE OF ANTICOAGULANTS   Status: 

Chronic   Priority: Medium   Current Visit: Yes   





(6) Chronic kidney disease (CKD), stage III (moderate)


SNOMED Code(s): 686411875


   Code(s): N18.3 - CHRONIC KIDNEY DISEASE, STAGE 3 (MODERATE)   Status: 

Chronic   Priority: Medium   Current Visit: Yes   





(7) Hypoparathyroidism


SNOMED Code(s): 64335938


   Code(s): E20.9 - HYPOPARATHYROIDISM, UNSPECIFIED   Status: Chronic   Priority

: Low   Current Visit: No   


Qualifiers: 


   Hypoparathyroidism type: unspecified   Qualified Code(s): E20.9 - 

Hypoparathyroidism, unspecified   





(8) Hypothyroidism


SNOMED Code(s): 84621335


   Code(s): E03.9 - HYPOTHYROIDISM, UNSPECIFIED   Status: Chronic   Priority: 

Low   Current Visit: No   


Qualifiers: 


   Hypothyroidism type: unspecified   Qualified Code(s): E03.9 - Hypothyroidism

, unspecified   





- Problem List Review


Problem List Initiated/Reviewed/Updated: Yes





- Plan


Plan:: 





ASSESSMENT


Day of admission


- 4 days of melena x 5 with GARCIA and fatigue


   - Baseline Hb on 1/2020 11.9, admission 9.1


   - Positive guaiac


   - Previous colonoscopy >30 years ago


   - No prior episodes


   - No alcohol use or hepatic dysfunction history


- History of atrial fibrillation


   - Anticoagulation with Warfarin, INR therapeutic on admission


   - Rate controlled with Diltiazem and Metoprolol at home


   - Chart in clinic with " CHF" after hospital admission, no echocardiogram on 

record


- Hyperparathyroidism, normal calcium level


- Baseline GFR on admission as per clinic chart





5/28


- Feeling OK


- Hb trending down


- Surgery has been consulted and will perform endoscopy tomorrow


- Bowel prep today


- Clear liquid diet until 5PM, NPO after





PLAN BY PROBLEMS


Upper GI bleed, stable


Microcytic hypochromic anemia


Dyspnea on exertion


- Pantoprazole BID


- Trend Hb/Hct every 8 hours


- Echocardiogram





Atrial fibrillation


Warfarin anticoagulation


- Hold Warfarin


- Monitor rate with VS  





Chronic kidney disease (CKD), stage III (moderate)


- Monitor urine output


- Avoid nephrotoxic and renally dosed medications


- Repeat labs in AM   





Hypoparathyroidism


- Follow up on calcium level post transfusion if needed





Hypothyroidism


- Continue home medication





PROPHYLAXIS


DVT- contraindicated due to GI bleed


GI- Scheduled pantoprazole BID





CODE STATUS: DNR/DNI





DISPOSITION:


Patient will remain admitted under observation to trend hemoglobin and 

endoscopy tomorrow.

## 2020-05-29 NOTE — PCM.PN
- General Info


Date of Service: 05/29/20


Admission Dx/Problem (Free Text): 


 Admission Diagnosis/Problem





Admission Diagnosis/Problem      Gastrointestinal hemorrhage








Subjective Update: 


Reports she still feels somewhat weak but not too bad.


Last BM yesterday


No nursing or patient concerns today


Recovering well from EGD/Colonoscopy


Not too hungry yet





Functional Status: Reports: Pain Controlled, Ambulating, Urinating.  Denies: 

Tolerating Diet (Not eating yet. ), New Symptoms





- Review of Systems


General: Reports: Weakness, Chills (Room is cold - heat increased ).  Denies: 

Fever, Fatigue, Malaise


HEENT: Reports: No Symptoms.  Denies: Headaches, Sore Throat


Pulmonary: Reports: No Symptoms.  Denies: Shortness of Breath, Cough, Sputum, 

Wheezing


Cardiovascular: Reports: No Symptoms.  Denies: Chest Pain, Palpitations, 

Dyspnea on Exertion, Edema


Gastrointestinal: Reports: No Symptoms.  Denies: Abdominal Pain, Constipation, 

Diarrhea, Vomiting


Genitourinary: Reports: No Symptoms.  Denies: Pain


Musculoskeletal: Reports: Back Pain (Chronic )


Skin: Reports: No Symptoms.  Denies: Cyanosis


Neurological: Reports: No Symptoms.  Denies: Confusion, Difficulty Walking, 

Gait Disturbance


Psychiatric: Reports: No Symptoms





- Patient Data


Vitals - Most Recent: 


 Last Vital Signs











Temp  98.1 F   05/29/20 07:40


 


Pulse  101 H  05/29/20 10:31


 


Resp  16   05/29/20 07:40


 


BP  126/103 H  05/29/20 10:31


 


Pulse Ox  96   05/29/20 10:31








 





Orthostatic Blood Pressure [     121/89


Standing]                        


Orthostatic Blood Pressure [     136/59


Supine]                          








Weight - Most Recent: 156 lb 8 oz


I&O - Last 24 Hours: 


 Intake & Output











 05/28/20 05/29/20 05/29/20





 22:59 06:59 14:59


 


Intake Total 848 950 


 


Output Total 3195 700 


 


Balance -2347 250 











Lab Results Last 24 Hours: 


 Laboratory Results - last 24 hr











  05/28/20 05/28/20 05/29/20 Range/Units





  15:54 23:41 06:15 


 


Hgb  9.2 L  7.8 L   (11.2-15.7)  gm/dl


 


Hct  28.7 L  24.8 L   (34.1-44.9)  %


 


PT    21.9 H  (9.7-12.0)  SECONDS


 


INR    2.10  














  05/29/20 Range/Units





  07:00 


 


Hgb  8.3 L  (11.2-15.7)  gm/dl


 


Hct  26.0 L  (34.1-44.9)  %


 


PT   (9.7-12.0)  SECONDS


 


INR   











Med Orders - Current: 


 Current Medications





Benzocaine/Menthol (Cepacol Sore Throat)  1 lozenge MUCMEM Q2HR PRN


   PRN Reason: throat irritation 


Furosemide (Lasix)  20 mg PO DAILY [facility]


Lactated Ringer's (Ringers, Lactated)  1,000 mls @ 75 mls/hr IV ASDIRECTED [facility]


   Last Admin: 05/29/20 04:41 Dose:  75 mls/hr


Levothyroxine Sodium (Synthroid)  100 mcg PO ACBREAKFAST [facility]


Melatonin (Melatonin)  9 mg PO BEDTIME [facility]


   Last Admin: 05/28/20 20:57 Dose:  9 mg


Metoprolol Tartrate (Lopressor)  25 mg PO BID [facility]


Non-Formulary Medication (Diltiazem Hcl [Diltiazem 12hr Er])  120 mg PO DAILY 

[facility]


Non-Formulary Medication (Ferrous Sulfate)  65 tab PO DAILY [facility]


Ondansetron HCl (Zofran Odt)  4 mg PO Q6H PRN


   PRN Reason: nausea, able to take PO


Ondansetron HCl (Zofran)  4 mg IV Q6H PRN


   PRN Reason: Nausea/Vomiting


Ondansetron HCl (Zofran)  4 mg IVPUSH ONETIME PRN


   PRN Reason: Nausea/Vomiting


Pantoprazole Sodium (Protonix Iv***)  40 mg IVPUSH Q12H [facility]


   Last Admin: 05/29/20 06:29 Dose:  40 mg





Discontinued Medications





Ephedrine Sulfate (Ephedrine 25 Mg/5 Ml Syringe) Confirm Administered Dose 25 

mg IV .STK-MED ONE


   Stop: 05/29/20 09:09


Sodium Chloride (Normal Saline)  1,000 mls @ 100 mls/hr IV ASDIRECTED [facility]


   Last Admin: 05/27/20 16:15 Dose:  100 mls/hr


Lidocaine HCl (Xylocaine-Mpf 1%) Confirm Administered Dose 4 mls @ as directed 

.ROUTE .STK-MED ONE


   Stop: 05/29/20 07:12


Lactated Ringer's (Ringers, Lactated) Confirm Administered Dose 1,000 mls @ as 

directed .ROUTE .STK-MED ONE


   Stop: 05/29/20 08:38


Ketamine HCl (Ketalar) Confirm Administered Dose 500 mg .ROUTE .STK-MED ONE


   Stop: 05/29/20 08:25


Miscellaneous Medication (Phenylephrine 1 Mg/10 Ml-Ns) Confirm Administered 

Dose 1 mg IV .STK-MED ONE


   Stop: 05/29/20 08:56


Polyethylene Glycol/Electrolytes (Golytely)  4,000 ml PO ONETIME ONE


   Stop: 05/28/20 07:06


   Last Admin: 05/28/20 07:50 Dose:  4,000 ml


Propofol (Diprivan  20 Ml) Confirm Administered Dose 400 mg .ROUTE .STK-MED ONE


   Stop: 05/29/20 07:12











- Exam


Quality Assessment: Supplemental Oxygen, DVT Prophylaxis (LIOR hose, INR 2)


General: Alert, Oriented, Cooperative, No Acute Distress


HEENT: Pupils Equal, Pupils Reactive, Mucous Membr. Moist/Pink


Neck: Supple, Trachea Midline


Lungs: Clear to Auscultation, Normal Respiratory Effort


Cardiovascular: Irregular Rhythm (HR 120s - has not received AM meds yet - 

ordered )


GI/Abdominal Exam: Normal Bowel Sounds, Soft, Non-Tender, No Distention


 (Female) Exam: Deferred


Extremities: Normal Inspection, Normal Range of Motion, Non-Tender, No Pedal 

Edema, Normal Capillary Refill


Skin: Warm, Dry, Intact


Neurological: No New Focal Deficit


Psy/Mental Status: Alert, Normal Affect, Normal Mood





Sepsis Event Note





- Evaluation


Sepsis Screening Result: No Definite Risk





- Focused Exam


Vital Signs: 


 Vital Signs











  Temp Pulse Resp BP Pulse Ox


 


 05/29/20 10:31   101 H   126/103 H  96


 


 05/29/20 10:01   87   120/69  96


 


 05/29/20 09:39   95   114/59 L  93 L


 


 05/29/20 07:40  98.1 F  63  16  132/92 H  97


 


 05/29/20 00:10  98.1 F  74  14  127/70  93 L











Date Exam was Performed: 05/29/20


Time Exam was Performed: 11:43





- Problem List & Annotations


(1) Atrial fibrillation


SNOMED Code(s): 32035542


   Code(s): I48.91 - UNSPECIFIED ATRIAL FIBRILLATION   Status: Chronic   

Priority: High   Current Visit: Yes   


Qualifiers: 


   Atrial fibrillation type: unspecified   Qualified Code(s): I48.91 - 

Unspecified atrial fibrillation   





(2) Chronic kidney disease (CKD), stage III (moderate)


SNOMED Code(s): 236113690


   Code(s): N18.3 - CHRONIC KIDNEY DISEASE, STAGE 3 (MODERATE)   Status: 

Chronic   Priority: Medium   Current Visit: Yes   





(3) Dyspnea on exertion


SNOMED Code(s): 94181360


   Code(s): R06.00 - DYSPNEA, UNSPECIFIED   Status: Acute   Priority: High   

Current Visit: Yes   





(4) Microcytic hypochromic anemia


SNOMED Code(s): 86903514


   Code(s): D50.9 - IRON DEFICIENCY ANEMIA, UNSPECIFIED   Status: Acute   

Priority: High   Current Visit: Yes   





(5) Upper GI bleed


SNOMED Code(s): 53643003


   Code(s): K92.2 - GASTROINTESTINAL HEMORRHAGE, UNSPECIFIED   Status: Acute   

Priority: High   Current Visit: Yes   





(6) Warfarin anticoagulation


SNOMED Code(s): 01214055, 319648831, 881096544


   Code(s): Z79.01 - LONG TERM (CURRENT) USE OF ANTICOAGULANTS   Status: 

Chronic   Priority: Medium   Current Visit: Yes   





(7) Hypoparathyroidism


SNOMED Code(s): 16419659


   Code(s): E20.9 - HYPOPARATHYROIDISM, UNSPECIFIED   Status: Chronic   Priority

: Low   Current Visit: No   


Qualifiers: 


   Hypoparathyroidism type: unspecified   Qualified Code(s): E20.9 - 

Hypoparathyroidism, unspecified   





(8) Hypothyroidism


SNOMED Code(s): 47847847


   Code(s): E03.9 - HYPOTHYROIDISM, UNSPECIFIED   Status: Chronic   Priority: 

Low   Current Visit: No   


Qualifiers: 


   Hypothyroidism type: unspecified   Qualified Code(s): E03.9 - Hypothyroidism

, unspecified   





- Problem List Review


Problem List Initiated/Reviewed/Updated: Yes





- My Orders


Last 24 Hours: 


My Active Orders





05/29/20 10:25


Benzocaine/Cetylpyrd/Menthol [Cepacol Sore Throat]   1 lozenge MUCMEM Q2HR PRN 





05/29/20 10:45


Metoprolol Tartrate [Lopressor]   25 mg PO BID 


dilTIAZem HCL [Diltiazem 12Hr ER]   120 mg PO DAILY 





05/30/20 06:00


Levothyroxine [Synthroid]   100 mcg PO ACBREAKFAST 





05/30/20 09:00


Ferrous Sulfate   65 tab PO DAILY 


Furosemide [Lasix]   20 mg PO DAILY 














- Plan


Plan:: 





ASSESSMENT


Day of admission


- 4 days of melena x 5 with GARCIA and fatigue


   - Baseline Hb on 1/2020 11.9, admission 9.1


   - Positive guaiac


   - Previous colonoscopy >30 years ago


   - No prior episodes


   - No alcohol use or hepatic dysfunction history


- History of atrial fibrillation


   - Anticoagulation with Warfarin, INR therapeutic on admission


   - Rate controlled with Diltiazem and Metoprolol at home


   - Chart in clinic with " CHF" after hospital admission, no echocardiogram on 

record


- Hyperparathyroidism, normal calcium level


- Baseline GFR on admission as per clinic chart





5/28


- Feeling OK


- Hb trending down


- Surgery has been consulted and will perform endoscopy tomorrow


- Bowel prep today


- Clear liquid diet until 5PM, NPO after





5/29-Day 2 


- S/P EGD/Colonoscopy with Dr. Edmonds: EGD- Clear; Colonoscopy Melena and 

diverticula - no signs of active bleeding 


- Continue current treatment plan


- HGB trend 9.1-->9.8-->8.4-->8.4-->8.6-->9.2-->7.8-->8.3


- Continue to hold warfarin


- No need transfusions thus far


- HAS-BLED score of 3- High risk of major bleeding 5.8%


- [id number] score of 4 - 4.8% stroke risk


- Feels pretty good


- Last BM yesterday with bowel prep


- Still not very hungry 








PLAN BY PROBLEMS


Upper GI bleed, stable


Microcytic hypochromic anemia


Dyspnea on exertion


- Pantoprazole BID


- Continue to trend Hb/Hct 


- Return to heart healthy diet


- Echocardiogram ordered today





Atrial fibrillation


Warfarin anticoagulation


- Continue to hold Warfarin


- Monitor rate with VS  


- Resume home rate control meds





Chronic kidney disease (CKD), stage III (moderate)


- Monitor urine output


- Avoid nephrotoxic and renally dosed medications


- Repeat labs in AM   





Hypoparathyroidism


- Follow up on calcium level post transfusion if needed





Hypothyroidism


- Continue home medication





PROPHYLAXIS


DVT- contraindicated due to GI bleed; LIOR hose 


GI- Scheduled pantoprazole BID





CODE STATUS: DNR/DNI





DISPOSITION:


Patient will remain admitted observation for Hgb trend, Echo, and PT 

evaluation. Likely discharge 5/30/20. 





SOCIAL:


Resident of [facility]


No walker or cane, ambulates without difficulty


No alcohol use


PCP- Bernice Frye


MiniCOG 0/3

## 2020-05-29 NOTE — PCM.PRNOTE
- Free Text/Narrative


Note: 





Date: 5/29/2020


Procedure: diagnostic upper and lower endoscopy


Indication: GI bleed





Findings: no source of bleeding identified on upper or lower scope. Extensive 

diverticular disease in colon, with melena throughout. No bright red blood or 

cancerous lesion identified. 





Detailed Report:


The patient was taken to the GI suite and placed in left lateral decubitus 

position. Time out was performed and monitored anesthesia care initiated. A 

bite block was placed, and the endoscope was inserted into the mouth and 

advanced to the duodenum with ease. No blood was seen as the scope was 

withdrawn through the stomach and esophagus. 


Next, colonoscopy was performed. There was melena noted in the patient's 

undergarment. The anus appeared normal, and digital rectal exam was normal. The 

scope was inserted and advanced to the cecum. navigating the sigmoid was 

difficult due to redundancy and extensive diverticular disease. Melena coated 

the mucosa throughout the length of the colon. No bright red blood or active 

hemorrhage was seen. The ileocecal valve was visualized. No culprit malignancy 

was identified. The scope was withdrawn and air evacuated. The patient 

tolerated the procedure well. 








Braulio Edmonds MD


General Surgery

## 2020-05-29 NOTE — [id number]
Post Anesthesia Note





- EVALUATION WITHIN 48HRS OF ANESTHETIC


Vital Signs in Normal Range: Yes


Patient Participated in Evaluation: Yes


Respiratory Function Stable: Yes (NC oxygen on at 3 liters- Titrate)


Airway Patent: Yes


Cardiovascular Function Stable: Yes


Hydration Status Stable: Yes


Pain Control Satisfactory: Yes


Nausea and Vomiting Control Satisfactory: Yes


Mental Status Recovered: Yes (Back to room- awake and talking- informed to stay 

in bed )


Vital Signs: 


 Last Vital Signs











Temp  98.1 F   05/29/20 07:40


 


Pulse  63   05/29/20 07:40


 


Resp  16   05/29/20 07:40


 


BP  132/92 H  05/29/20 07:40


 


Pulse Ox  97   05/29/20 07:40








 





Orthostatic Blood Pressure [     121/89


Standing]                        


Orthostatic Blood Pressure [     136/59


Supine]                          





0935


120-89


111/64


91%


20


97.5

## 2020-05-30 NOTE — PCM.PN
- General Info


Date of Service: 05/30/20


Subjective Update: 





Feeling OK


Slept OK


Tolerating diet


Ambulating to and from restroom with assistance 





- Patient Data


Vitals - Most Recent: 


 Last Vital Signs











Temp  98.4 F   05/30/20 08:04


 


Pulse  88   05/30/20 11:49


 


Resp  18   05/30/20 08:04


 


BP  121/62   05/30/20 11:49


 


Pulse Ox  100   05/30/20 11:49








 





Orthostatic Blood Pressure [     121/89


Standing]                        


Orthostatic Blood Pressure [     136/59


Supine]                          








Weight - Most Recent: 72.257 kg





- Exam


General: Alert, Oriented, Cooperative, No Acute Distress


HEENT: Pupils Equal, Pupils Reactive, EOMI, Mucous Membr. Moist/Pink


Neck: Supple, Trachea Midline, No JVD, No Thyromegaly, +2 Carotid Pulse wo Bruit


Lungs: Clear to Auscultation, Normal Respiratory Effort, Crackles.  No: Rales, 

Rhonchi, Rub, Wheezing


Cardiovascular: Regular Rate, Regular Rhythm.  No: Murmurs, Gallops, Rubs


GI/Abdominal Exam: Normal Bowel Sounds, Soft, Non-Tender.  No: Distended, 

Guarding, Rigid, Rebound


Extremities: Normal Inspection, Normal Range of Motion


Peripheral Pulses: 2+: Brachial (L), Brachial (R)





Sepsis Event Note





- Evaluation


Sepsis Screening Result: No Definite Risk





- Problem List & Annotations


(1) Upper GI bleed


SNOMED Code(s): 31899417


   Code(s): K92.2 - GASTROINTESTINAL HEMORRHAGE, UNSPECIFIED   Status: Acute   

Priority: High   





(2) Microcytic hypochromic anemia


SNOMED Code(s): 46135715


   Code(s): D50.9 - IRON DEFICIENCY ANEMIA, UNSPECIFIED   Status: Acute   

Priority: High   





(3) Dyspnea on exertion


SNOMED Code(s): 08772821


   Code(s): R06.00 - DYSPNEA, UNSPECIFIED   Status: Acute   Priority: High   





(4) Atrial fibrillation


SNOMED Code(s): 98062400


   Code(s): I48.91 - UNSPECIFIED ATRIAL FIBRILLATION   Status: Chronic   

Priority: High   


Qualifiers: 


   Atrial fibrillation type: unspecified   Qualified Code(s): I48.91 - 

Unspecified atrial fibrillation   





(5) Warfarin anticoagulation


SNOMED Code(s): 67411618, 127370616, 192405351


   Code(s): Z79.01 - LONG TERM (CURRENT) USE OF ANTICOAGULANTS   Status: 

Chronic   Priority: Medium   





(6) Chronic kidney disease (CKD), stage III (moderate)


SNOMED Code(s): 626539648


   Code(s): N18.3 - CHRONIC KIDNEY DISEASE, STAGE 3 (MODERATE)   Status: 

Chronic   Priority: Medium   





(7) Hypoparathyroidism


SNOMED Code(s): 02660235


   Code(s): E20.9 - HYPOPARATHYROIDISM, UNSPECIFIED   Status: Chronic   Priority

: Low   


Qualifiers: 


   Hypoparathyroidism type: unspecified   Qualified Code(s): E20.9 - 

Hypoparathyroidism, unspecified   





(8) Hypothyroidism


SNOMED Code(s): 07822354


   Code(s): E03.9 - HYPOTHYROIDISM, UNSPECIFIED   Status: Chronic   Priority: 

Low   


Qualifiers: 


   Hypothyroidism type: unspecified   Qualified Code(s): E03.9 - Hypothyroidism

, unspecified   





(9) Hypomagnesemia


SNOMED Code(s): 918630917


   Code(s): E83.42 - HYPOMAGNESEMIA   Status: Acute   





(10) Diverticula of colon


SNOMED Code(s): 511558057, 195546466


   Code(s): K57.30 - DVRTCLOS OF LG INT W/O PERFORATION OR ABSCESS W/O BLEEDING

   Status: Acute   





- Problem List Review


Problem List Initiated/Reviewed/Updated: Yes





- Assessment


Assessment:: 





ASSESSMENT


Day of admission


- 4 days of melena x 5 with GARCIA and fatigue


   - Baseline Hb on 1/2020 11.9, admission 9.1


   - Positive guaiac


   - Previous colonoscopy >30 years ago


   - No prior episodes


   - No alcohol use or hepatic dysfunction history


- History of atrial fibrillation


   - Anticoagulation with Warfarin, INR therapeutic on admission


   - Rate controlled with Diltiazem and Metoprolol at home


   - Chart in clinic with " CHF" after hospital admission, no echocardiogram on 

record


- Hyperparathyroidism, normal calcium level


- Baseline GFR on admission as per clinic chart





5/28


- Feeling OK


- Hb trending down


- Surgery has been consulted and will perform endoscopy tomorrow


- Bowel prep today


- Clear liquid diet until 5PM, NPO after





5/29-Day 2 


- S/P EGD/Colonoscopy with Dr. Edmonds: EGD- Clear; Colonoscopy Melena and 

diverticula - no signs of active bleeding 


- Continue current treatment plan


- HGB trend 9.1-->9.8-->8.4-->8.4-->8.6-->9.2-->7.8-->8.3


- Continue to hold warfarin


- No need transfusions thus far


- HAS-BLED score of 3- High risk of major bleeding 5.8%


- [id number] score of 4 - 4.8% stroke risk


- Feels pretty good


- Last BM yesterday with bowel prep


- Still not very hungry 





5/30


- Hb improved


- Na 135


- Mg 1.6


- VS stable


- Room air








- Plan


Plan:: 








PLAN BY PROBLEMS


Upper GI bleed, stable


Microcytic hypochromic anemia


Dyspnea on exertion


- Pantoprazole BID


- Continue to trend Hb/Hct 





Atrial fibrillation


Warfarin anticoagulation


- Continue to hold Warfarin


- Monitor rate with VS  


- Resume home rate control meds





Chronic kidney disease (CKD), stage III (moderate)


- Monitor urine output


- Avoid nephrotoxic and renally dosed medications


- Repeat labs in AM   





Hypoparathyroidism


- Follow up on calcium level post transfusion if needed





Hypothyroidism


- Continue home medication





PROPHYLAXIS


DVT- contraindicated due to GI bleed; LIOR hose 


GI- Scheduled pantoprazole BID





CODE STATUS: DNR/DNI





DISPOSITION:


Patient will remain admitted for Hgb trend and stabilization.





SOCIAL:


Resident of [facility]


No walker or cane, ambulates without difficulty


No alcohol use


PCP- Bernice Frye


MiniCOG 0/3

## 2020-06-01 NOTE — CT
CT chest

 

Technique: Multiple axial sections through the chest were obtained.  

Intravenous contrast was utilized.  Study performed as a pulmonary 

angiogram protocol.

 

Findings: Pulmonary arteries are well opacified.  No filling defects 

are seen to indicate pulmonary embolism.  Small bilateral pleural 

effusions are noted.  Coronary artery calcification is noted.  Heart 

is enlarged.  Aorta shows atherosclerotic calcification without 

aneurysm.

 

Increased haziness around central pulmonary markings are seen most 

likely representing pulmonary vascular congestion.  Patchy areas of 

increased density with the left lung either due to atelectasis or 

developing pneumonia.  Differential also includes asymmetric pulmonary

 edema.  Small bilateral pleural effusions are seen.

 

Impression:

1.  Small bilateral pleural effusions with haziness around the 

perihilar markings.  Given that cardiomegaly is present findings are 

suspicious for CHF.

2.  Patchy areas of increased density within the left chest with 

differential including atelectasis, developing areas of pneumonia or 

asymmetric pulmonary edema.

3.  No findings of pulmonary embolism.

4.  Other findings believed to be incidental and nonacute as described

 above.

 

Diagnostic code #3

 

This report was dictated in MDT

 

I agree with preliminary report from [facility], finalized on 05/30/20, 2:46

 PM Central Daylight Time

## 2020-06-01 NOTE — [id number]
**Discharge Summary





- Hospital Course


HPI Initial Comments: 





This is an 89-year-old female with past medical history of atrial fibrillation 

on Warfarin for anticoagulation who comes to the ED complaining of dark and 

tarry stools for 4 days. 


As per patient she has been having 5 dark tarry bowel movements a day for the 

past 4 days, this has never happened before. 


She associates this to decreased exercise tolerance and SOB, she usually walks 

around [facility] twice every day, this has been decreasing up to her not 

being able to do anything yesterday for which she decided to go to outpatient 

clinic. 


Once in clinic a guaiac was performed which was resulted positive, patient also 

had a melanous bowel movement for which she was sent to ED for further 

evaluation. 


Diagnosis: Stroke: No





- Discharge Data


Discharge Date: 05/31/20


Discharge Disposition: Home, Self-Care 01


Condition: Good





- Referral to Home Health


Primary Care Physician: 


Bernice Frye NP








- Discharge Diagnosis/Problem(s)


(1) Upper GI bleed


SNOMED Code(s): 71188157


   ICD Code: K92.2 - GASTROINTESTINAL HEMORRHAGE, UNSPECIFIED   Status: Acute   

Priority: High   





(2) Microcytic hypochromic anemia


SNOMED Code(s): 01740234


   ICD Code: D50.9 - IRON DEFICIENCY ANEMIA, UNSPECIFIED   Status: Acute   

Priority: High   





(3) Dyspnea on exertion


SNOMED Code(s): 54699398


   ICD Code: R06.00 - DYSPNEA, UNSPECIFIED   Status: Acute   Priority: High   





(4) Atrial fibrillation


SNOMED Code(s): 01193840


   ICD Code: I48.91 - UNSPECIFIED ATRIAL FIBRILLATION   Status: Chronic   

Priority: High   


Qualifiers: 


   Atrial fibrillation type: unspecified   Qualified Code(s): I48.91 - 

Unspecified atrial fibrillation   





(5) Warfarin anticoagulation


SNOMED Code(s): 44741965, 706877880, 984724315


   ICD Code: Z79.01 - LONG TERM (CURRENT) USE OF ANTICOAGULANTS   Status: 

Chronic   Priority: Medium   





(6) Chronic kidney disease (CKD), stage III (moderate)


SNOMED Code(s): 893147814


   ICD Code: N18.3 - CHRONIC KIDNEY DISEASE, STAGE 3 (MODERATE)   Status: 

Chronic   Priority: Medium   





(7) Hypoparathyroidism


SNOMED Code(s): 91391140


   ICD Code: E20.9 - HYPOPARATHYROIDISM, UNSPECIFIED   Status: Chronic   

Priority: Low   


Qualifiers: 


   Hypoparathyroidism type: unspecified   Qualified Code(s): E20.9 - 

Hypoparathyroidism, unspecified   





(8) Hypothyroidism


SNOMED Code(s): 45481968


   ICD Code: E03.9 - HYPOTHYROIDISM, UNSPECIFIED   Status: Chronic   Priority: 

Low   


Qualifiers: 


   Hypothyroidism type: unspecified   Qualified Code(s): E03.9 - Hypothyroidism

, unspecified   





(9) Hypomagnesemia


SNOMED Code(s): 719726522


   ICD Code: E83.42 - HYPOMAGNESEMIA   Status: Acute   





(10) Diverticula of colon


SNOMED Code(s): 515213613, 779956403


   ICD Code: K57.30 - DVRTCLOS OF LG INT W/O PERFORATION OR ABSCESS W/O 

BLEEDING   Status: Acute   





- Patient Summary/Data


Operative Procedure(s) Performed: EGD and Colonoscopy.  Date: 5/29/2020.  

Procedure: diagnostic upper and lower endoscopy.  Indication: GI bleed.  

Findings: no source of bleeding identified on upper or lower scope. Extensive 

diverticular disease in colon, with melena throughout. No bright red blood or 

cancerous lesion identified.  Detailed Report:  The patient was taken to the GI 

suite and placed in left lateral decubitus position. Time out was performed and 

monitored anesthesia care initiated. A bite block was placed, and the endoscope 

was inserted into the mouth and advanced to the duodenum with ease. No blood 

was seen as the scope was withdrawn through the stomach and esophagus.  Next, 

colonoscopy was performed. There was melena noted in the patient's 

undergarment. The anus appeared normal, and digital rectal exam was normal. The 

scope was inserted and advanced to the cecum. navigating the sigmoid was 

difficult due to redundancy and extensive diverticular disease. Melena coated 

the mucosa throughout the length of the colon. No bright red blood or active 

hemorrhage was seen. The ileocecal valve was visualized. No culprit malignancy 

was identified. The scope was withdrawn and air evacuated. The patient 

tolerated the procedure well.


Hospital Course: 





ASSESSMENT


Day of admission


- 4 days of melena x 5 with GARCIA and fatigue


   - Baseline Hb on 1/2020 11.9, admission 9.1


   - Positive guaiac


   - Previous colonoscopy >30 years ago


   - No prior episodes


   - No alcohol use or hepatic dysfunction history


- History of atrial fibrillation


   - Anticoagulation with Warfarin, INR therapeutic on admission


   - Rate controlled with Diltiazem and Metoprolol at home


   - Chart in clinic with " CHF" after hospital admission, no echocardiogram on 

record


- Hyperparathyroidism, normal calcium level


- Baseline GFR on admission as per clinic chart





5/28


- Feeling OK


- Hb trending down


- Surgery has been consulted and will perform endoscopy tomorrow


- Bowel prep today


- Clear liquid diet until 5PM, NPO after





5/29-Day 2 


- S/P EGD/Colonoscopy with Dr. Edmonds: EGD- Clear; Colonoscopy Melena and 

diverticula - no signs of active bleeding 


- Continue current treatment plan


- HGB trend 9.1-->9.8-->8.4-->8.4-->8.6-->9.2-->7.8-->8.3


- Continue to hold warfarin


- No need transfusions thus far


- HAS-BLED score of 3- High risk of major bleeding 5.8%


- [id number] score of 4 - 4.8% stroke risk


- Feels pretty good


- Last BM yesterday with bowel prep


- Still not very hungry 





5/30


- Hb improved


- Na 135


- Mg 1.6


- VS stable


- Room air


- Discharge was planned but patient had a syncopal episode


   - Work up--> volume depletion


- Echocardiogram


   - RVSP 47.7


   - Elongated and mildly dilated left atrium


- Hemodynamically stable





5/31


- Slept OK


- Tolerating diet


- Regular BM


- Voiding urine


- Labs within normal limits


- Hemodynamically stable








- Patient Instructions


Diet: Heart Healthy Diet





- Discharge Plan


*PRESCRIPTION DRUG MONITORING PROGRAM REVIEWED*: Not Applicable


*COPY OF PRESCRIPTION DRUG MONITORING REPORT IN PATIENT L[facility]AS: Not Applicable


Prescriptions/Med Rec: 


Pantoprazole [ProTONIX***] 40 mg PO Q12H #60 tab.cr


Potassium Chloride 20 meq PO DAILY #60 tab.er.prt


rOPINIRole [Requip] 0.25 mg PO BEDTIME #30 tablet


Home Medications: 


 Home Meds





calcitrioL [Calcitriol] 0.25 mcg PO BID 10/12/18 [History]


Triamcinolone Acetonide [Triamcinolone Acetonide 0.1% Crm] 1 applic TOP 

ASDIRECTED 12/24/18 [History]


Metoprolol Tartrate 25 mg PO BID #60 tablet 12/27/18 [Rx]


Calcium Carbonate [Calcium] 600 mg PO DAILY 05/27/20 [History]


Furosemide [Lasix] 20 mg PO DAILY 05/27/20 [History]


Levothyroxine [Synthroid] 100 mcg PO ACBREAKFAST 05/27/20 [History]


Magnesium Oxide 250 mg PO BID 05/27/20 [History]


Psyllium Husk [Fiber] 0.4 gm PO DAILY 05/27/20 [History]


Vit C/E/Zn/Coppr/Lutein/Zeaxan [Preservision Areds 2 Softgel] 1 tab PO BID 05/27 /20 [History]


dilTIAZem HCL [Diltiazem 12Hr ER] 120 mg PO DAILY 05/27/20 [History]


Ferrous Sulfate [Slow Fe] 65 tab PO Q48H #0 05/31/20 [Rx]


Pantoprazole [ProTONIX***] 40 mg PO Q12H #60 tab.cr 05/31/20 [Rx]


Potassium Chloride 20 meq PO DAILY #60 tab.er.prt 05/31/20 [Rx]


rOPINIRole [Requip] 0.25 mg PO BEDTIME #30 tablet 05/31/20 [Rx]








Oxygen Therapy Mode: Room Air


Patient Handouts:  Gastrointestinal Bleeding


Referrals: 


Bernice Frye NP [Primary Care Provider] -  (Please make an appointment to 

follow-up with Bernice in 3-5 days.)





- Discharge Summary/Plan Comment


DC Time >30 min.: Yes





- General Info


Date of Service: 05/31/20


Subjective Update: 





Tolerating diet


Slept so so


Ambulating by self


BM today








- Patient Data


Vitals - Most Recent: 


 Last Vital Signs











Temp  97.7 F   05/31/20 16:00


 


Pulse  99   05/31/20 16:00


 


Resp  18   05/31/20 16:00


 


BP  134/92 H  05/31/20 16:00


 


Pulse Ox  95   05/31/20 16:00








Weight - Most Recent: 72.257 kg





- Exam


General: Reports: Alert, Oriented, Cooperative, No Acute Distress


HEENT: Reports: Pupils Equal, Pupils Reactive, EOMI, Mucous Membr. Moist/Pink


Neck: Reports: Supple, Trachea Midline, No JVD, No Thyromegaly, +2 Carotid 

Pulse wo Bruit


Lungs: Reports: Clear to Auscultation, Normal Respiratory Effort.  Denies: 

Decreased Breath Sounds, Crackles, Rales, Rhonchi, Rub, Stridor, Wheezing


Cardiovascular: Reports: Regular Rate, Regular Rhythm.  Denies: Murmurs, Gallops

, Rubs


GI/Abdominal Exam: Normal Bowel Sounds, Soft, Non-Tender.  No: Distended, 

Guarding, Rigid


Extremities: Normal Inspection, Normal Range of Motion, Non-Tender, No Pedal 

Edema, Normal Capillary Refill


Skin: Reports: Warm, Dry


Neurological: Reports: No New Focal Deficit


Psy/Mental Status: Reports: Alert, Normal Affect, Normal Mood





Discharge Operative/Procedures





- Procedures Performed


Operations/Procedure Comment: 





EGD and Colonoscopy


Date: 5/29/2020


Procedure: diagnostic upper and lower endoscopy


Indication: GI bleed





Findings: no source of bleeding identified on upper or lower scope. Extensive 

diverticular disease in colon, with melena throughout. No bright red blood or 

cancerous lesion identified. 





Detailed Report:


The patient was taken to the GI suite and placed in left lateral decubitus 

position. Time out was performed and monitored anesthesia care initiated. A 

bite block was placed, and the endoscope was inserted into the mouth and 

advanced to the duodenum with ease. No blood was seen as the scope was 

withdrawn through the stomach and esophagus. 


Next, colonoscopy was performed. There was melena noted in the patient's 

undergarment. The anus appeared normal, and digital rectal exam was normal. The 

scope was inserted and advanced to the cecum. navigating the sigmoid was 

difficult due to redundancy and extensive diverticular disease. Melena coated 

the mucosa throughout the length of the colon. No bright red blood or active 

hemorrhage was seen. The ileocecal valve was visualized. No culprit malignancy 

was identified. The scope was withdrawn and air evacuated. The patient 

tolerated the procedure well. 








Braulio Edmonds MD


General Surgery





*Q Meaningful Use (DIS)





- VTE *Q


VTE Anticoagulation Contraindications: Med/TX Not Indicated/Need